# Patient Record
Sex: FEMALE | Race: WHITE | NOT HISPANIC OR LATINO | Employment: UNEMPLOYED | URBAN - METROPOLITAN AREA
[De-identification: names, ages, dates, MRNs, and addresses within clinical notes are randomized per-mention and may not be internally consistent; named-entity substitution may affect disease eponyms.]

---

## 2019-05-03 ENCOUNTER — HOSPITAL ENCOUNTER (OUTPATIENT)
Dept: OTHER | Facility: HOSPITAL | Age: 59
Discharge: HOME | End: 2019-05-03
Attending: INTERNAL MEDICINE

## 2019-05-03 LAB
ALBUMIN SERPL-MCNC: 3.9 G/DL (ref 3.5–5)
ALP SERPL-CCNC: 129 U/L (ref 53–141)
ALT SERPL-CCNC: 36 U/L (ref 10–40)
AST SERPL-CCNC: 29 U/L (ref 15–50)
BILIRUB SERPL-MCNC: 0.41 MG/DL (ref 0.2–1.3)
CHOLEST SERPL-MCNC: 125 MG/DL (ref 107–200)
CHOLEST/HDLC SERPL: 3.2 {RATIO} (ref 3–6)
CONV BILI, CONJUGATED: <0.2 MG/DL (ref 0–0.6)
CONV TOTAL PROTEIN: 7.5 G/DL (ref 6.3–8.2)
CONV UNCONJUGATED BILIRUBIN: 0.2 MG/DL (ref 0–1.1)
HDLC SERPL-MCNC: 39 MG/DL (ref 40–60)
LDLC SERPL CALC-MCNC: 64 MG/DL (ref 70–100)
TRIGL SERPL-MCNC: 109 MG/DL (ref 40–150)
VLDLC SERPL-MCNC: 22 MG/DL (ref 5–37)

## 2019-12-09 ENCOUNTER — OUTSIDE FACILITY SERVICE (OUTPATIENT)
Dept: SLEEP MEDICINE | Facility: HOSPITAL | Age: 59
End: 2019-12-09

## 2019-12-09 ENCOUNTER — HOSPITAL ENCOUNTER (OUTPATIENT)
Dept: SLEEP MEDICINE | Facility: HOSPITAL | Age: 59
Discharge: HOME | End: 2019-12-09
Attending: INTERNAL MEDICINE

## 2019-12-09 PROCEDURE — 99244 OFF/OP CNSLTJ NEW/EST MOD 40: CPT | Performed by: INTERNAL MEDICINE

## 2019-12-12 ENCOUNTER — HOSPITAL ENCOUNTER (OUTPATIENT)
Dept: SLEEP MEDICINE | Facility: HOSPITAL | Age: 59
Discharge: HOME | End: 2019-12-12
Attending: INTERNAL MEDICINE

## 2019-12-16 ENCOUNTER — OUTSIDE FACILITY SERVICE (OUTPATIENT)
Dept: SLEEP MEDICINE | Facility: HOSPITAL | Age: 59
End: 2019-12-16

## 2019-12-16 PROCEDURE — 95806 SLEEP STUDY UNATT&RESP EFFT: CPT | Performed by: INTERNAL MEDICINE

## 2019-12-18 ENCOUNTER — HOSPITAL ENCOUNTER (OUTPATIENT)
Dept: SLEEP MEDICINE | Facility: HOSPITAL | Age: 59
Discharge: HOME | End: 2019-12-18
Attending: INTERNAL MEDICINE

## 2019-12-18 ENCOUNTER — OUTSIDE FACILITY SERVICE (OUTPATIENT)
Dept: SLEEP MEDICINE | Facility: HOSPITAL | Age: 59
End: 2019-12-18

## 2019-12-18 PROCEDURE — 99213 OFFICE O/P EST LOW 20 MIN: CPT | Performed by: INTERNAL MEDICINE

## 2019-12-23 ENCOUNTER — OUTSIDE FACILITY SERVICE (OUTPATIENT)
Dept: SLEEP MEDICINE | Facility: HOSPITAL | Age: 59
End: 2019-12-23

## 2019-12-23 PROCEDURE — 95811 POLYSOM 6/>YRS CPAP 4/> PARM: CPT | Performed by: INTERNAL MEDICINE

## 2020-01-15 ENCOUNTER — HOSPITAL ENCOUNTER (OUTPATIENT)
Dept: GENERAL RADIOLOGY | Facility: HOSPITAL | Age: 60
Discharge: HOME | End: 2020-01-15
Attending: INTERNAL MEDICINE

## 2020-01-15 LAB
ALBUMIN SERPL-MCNC: 4.3 G/DL (ref 3.5–5)
ALP SERPL-CCNC: 104 U/L (ref 53–141)
ALT SERPL-CCNC: 25 U/L (ref 10–40)
AST SERPL-CCNC: 23 U/L (ref 15–50)
BILIRUB SERPL-MCNC: 0.62 MG/DL (ref 0.2–1.3)
CHOLEST SERPL-MCNC: 114 MG/DL (ref 107–200)
CHOLEST/HDLC SERPL: 3.4 {RATIO} (ref 3–6)
CONV BILI, CONJUGATED: <0.2 MG/DL (ref 0–0.6)
CONV TOTAL PROTEIN: 8 G/DL (ref 6.3–8.2)
CONV UNCONJUGATED BILIRUBIN: 0.4 MG/DL (ref 0–1.1)
HDLC SERPL-MCNC: 34 MG/DL (ref 40–60)
LDLC SERPL CALC-MCNC: 64 MG/DL (ref 70–100)
TRIGL SERPL-MCNC: 81 MG/DL (ref 40–150)
VLDLC SERPL-MCNC: 16 MG/DL (ref 5–37)

## 2020-02-24 ENCOUNTER — OUTSIDE FACILITY SERVICE (OUTPATIENT)
Dept: SLEEP MEDICINE | Facility: HOSPITAL | Age: 60
End: 2020-02-24

## 2020-02-24 ENCOUNTER — HOSPITAL ENCOUNTER (OUTPATIENT)
Dept: SLEEP MEDICINE | Facility: HOSPITAL | Age: 60
Discharge: HOME | End: 2020-02-24
Attending: INTERNAL MEDICINE

## 2020-02-24 PROCEDURE — 99214 OFFICE O/P EST MOD 30 MIN: CPT | Performed by: INTERNAL MEDICINE

## 2020-03-03 ENCOUNTER — HOSPITAL ENCOUNTER (OUTPATIENT)
Dept: GENERAL RADIOLOGY | Facility: HOSPITAL | Age: 60
Discharge: HOME | End: 2020-03-03
Attending: FAMILY MEDICINE

## 2020-07-20 ENCOUNTER — HOSPITAL ENCOUNTER (OUTPATIENT)
Dept: URGENT CARE | Facility: CLINIC | Age: 60
Discharge: HOME | End: 2020-07-20
Attending: FAMILY MEDICINE

## 2020-07-22 LAB — SARS-COV-2 RNA SPEC QL NAA+PROBE: NOT DETECTED

## 2020-12-16 ENCOUNTER — HOSPITAL ENCOUNTER (OUTPATIENT)
Dept: GENERAL RADIOLOGY | Facility: HOSPITAL | Age: 60
Discharge: HOME | End: 2020-12-16
Attending: INTERNAL MEDICINE

## 2020-12-16 LAB
ALBUMIN SERPL-MCNC: 4 G/DL (ref 3.5–5)
ALBUMIN/GLOB SERPL: 1.1 {RATIO} (ref 1.4–2.6)
ALP SERPL-CCNC: 99 U/L (ref 53–141)
ALT SERPL-CCNC: 42 U/L (ref 10–40)
ANION GAP SERPL CALC-SCNC: 20 MMOL/L (ref 8–19)
AST SERPL-CCNC: 42 U/L (ref 15–50)
BILIRUB SERPL-MCNC: 0.32 MG/DL (ref 0.2–1.3)
BUN SERPL-MCNC: 20 MG/DL (ref 5–25)
BUN/CREAT SERPL: 19 {RATIO} (ref 6–20)
CALCIUM SERPL-MCNC: 9.7 MG/DL (ref 8.7–10.4)
CHLORIDE SERPL-SCNC: 103 MMOL/L (ref 99–111)
CHOLEST SERPL-MCNC: 154 MG/DL (ref 107–200)
CHOLEST/HDLC SERPL: 3.3 {RATIO} (ref 3–6)
CONV CO2: 19 MMOL/L (ref 22–32)
CONV TOTAL PROTEIN: 7.8 G/DL (ref 6.3–8.2)
CREAT UR-MCNC: 1.07 MG/DL (ref 0.5–0.9)
GFR SERPLBLD BASED ON 1.73 SQ M-ARVRAT: 56 ML/MIN/{1.73_M2}
GLOBULIN UR ELPH-MCNC: 3.8 G/DL (ref 2–3.5)
GLUCOSE SERPL-MCNC: 111 MG/DL (ref 65–99)
HDLC SERPL-MCNC: 46 MG/DL (ref 40–60)
LDLC SERPL CALC-MCNC: 90 MG/DL (ref 70–100)
OSMOLALITY SERPL CALC.SUM OF ELEC: 287 MOSM/KG (ref 273–304)
POTASSIUM SERPL-SCNC: 4.9 MMOL/L (ref 3.5–5.3)
SODIUM SERPL-SCNC: 137 MMOL/L (ref 135–147)
TRIGL SERPL-MCNC: 90 MG/DL (ref 40–150)
VLDLC SERPL-MCNC: 18 MG/DL (ref 5–37)

## 2021-01-01 ENCOUNTER — TELEPHONE (OUTPATIENT)
Dept: NEUROSURGERY | Facility: CLINIC | Age: 61
End: 2021-01-01

## 2021-10-22 ENCOUNTER — LAB (OUTPATIENT)
Dept: LAB | Facility: HOSPITAL | Age: 61
End: 2021-10-22

## 2021-10-22 ENCOUNTER — TRANSCRIBE ORDERS (OUTPATIENT)
Dept: LAB | Facility: HOSPITAL | Age: 61
End: 2021-10-22

## 2021-10-22 DIAGNOSIS — E78.5 HYPERLIPIDEMIA, UNSPECIFIED HYPERLIPIDEMIA TYPE: ICD-10-CM

## 2021-10-22 DIAGNOSIS — E78.5 HYPERLIPIDEMIA, UNSPECIFIED HYPERLIPIDEMIA TYPE: Primary | ICD-10-CM

## 2021-10-22 LAB
ANION GAP SERPL CALCULATED.3IONS-SCNC: 7.8 MMOL/L (ref 5–15)
BUN SERPL-MCNC: 17 MG/DL (ref 8–23)
BUN/CREAT SERPL: 13.9 (ref 7–25)
CALCIUM SPEC-SCNC: 9.6 MG/DL (ref 8.6–10.5)
CHLORIDE SERPL-SCNC: 100 MMOL/L (ref 98–107)
CO2 SERPL-SCNC: 27.2 MMOL/L (ref 22–29)
CREAT SERPL-MCNC: 1.22 MG/DL (ref 0.57–1)
GFR SERPL CREATININE-BSD FRML MDRD: 45 ML/MIN/1.73
GLUCOSE SERPL-MCNC: 157 MG/DL (ref 65–99)
POTASSIUM SERPL-SCNC: 5 MMOL/L (ref 3.5–5.2)
SODIUM SERPL-SCNC: 135 MMOL/L (ref 136–145)

## 2021-10-22 PROCEDURE — 36415 COLL VENOUS BLD VENIPUNCTURE: CPT

## 2021-10-22 PROCEDURE — 80048 BASIC METABOLIC PNL TOTAL CA: CPT

## 2022-01-01 ENCOUNTER — HOSPITAL ENCOUNTER (EMERGENCY)
Facility: HOSPITAL | Age: 62
Discharge: HOME | End: 2022-09-02
Attending: STUDENT IN AN ORGANIZED HEALTH CARE EDUCATION/TRAINING PROGRAM | Admitting: STUDENT IN AN ORGANIZED HEALTH CARE EDUCATION/TRAINING PROGRAM

## 2022-01-01 ENCOUNTER — APPOINTMENT (OUTPATIENT)
Dept: CT IMAGING | Facility: HOSPITAL | Age: 62
End: 2022-01-01

## 2022-01-01 ENCOUNTER — TRANSCRIBE ORDERS (OUTPATIENT)
Dept: LAB | Facility: HOSPITAL | Age: 62
End: 2022-01-01

## 2022-01-01 ENCOUNTER — APPOINTMENT (OUTPATIENT)
Dept: GENERAL RADIOLOGY | Facility: HOSPITAL | Age: 62
End: 2022-01-01

## 2022-01-01 ENCOUNTER — READMISSION MANAGEMENT (OUTPATIENT)
Dept: CALL CENTER | Facility: HOSPITAL | Age: 62
End: 2022-01-01

## 2022-01-01 ENCOUNTER — APPOINTMENT (OUTPATIENT)
Dept: CARDIOLOGY | Facility: HOSPITAL | Age: 62
End: 2022-01-01

## 2022-01-01 ENCOUNTER — TRANSCRIBE ORDERS (OUTPATIENT)
Dept: ADMINISTRATIVE | Facility: HOSPITAL | Age: 62
End: 2022-01-01

## 2022-01-01 ENCOUNTER — HOSPITAL ENCOUNTER (EMERGENCY)
Facility: HOSPITAL | Age: 62
Discharge: HOME | End: 2022-09-05
Attending: EMERGENCY MEDICINE | Admitting: EMERGENCY MEDICINE

## 2022-01-01 ENCOUNTER — LAB (OUTPATIENT)
Dept: LAB | Facility: HOSPITAL | Age: 62
End: 2022-01-01

## 2022-01-01 ENCOUNTER — HOSPITAL ENCOUNTER (INPATIENT)
Facility: HOSPITAL | Age: 62
LOS: 2 days | Discharge: HOME HEALTH | End: 2022-10-22
Attending: EMERGENCY MEDICINE | Admitting: INTERNAL MEDICINE

## 2022-01-01 ENCOUNTER — APPOINTMENT (OUTPATIENT)
Dept: NEUROLOGY | Facility: HOSPITAL | Age: 62
End: 2022-01-01

## 2022-01-01 ENCOUNTER — HOSPITAL ENCOUNTER (INPATIENT)
Facility: HOSPITAL | Age: 62
LOS: 2 days | End: 2022-12-07
Attending: EMERGENCY MEDICINE | Admitting: STUDENT IN AN ORGANIZED HEALTH CARE EDUCATION/TRAINING PROGRAM

## 2022-01-01 ENCOUNTER — APPOINTMENT (OUTPATIENT)
Dept: MRI IMAGING | Facility: HOSPITAL | Age: 62
End: 2022-01-01

## 2022-01-01 VITALS
BODY MASS INDEX: 47.09 KG/M2 | OXYGEN SATURATION: 93 % | DIASTOLIC BLOOD PRESSURE: 64 MMHG | WEIGHT: 293 LBS | RESPIRATION RATE: 20 BRPM | TEMPERATURE: 98.2 F | HEART RATE: 65 BPM | HEIGHT: 66 IN | SYSTOLIC BLOOD PRESSURE: 153 MMHG

## 2022-01-01 VITALS
BODY MASS INDEX: 47.09 KG/M2 | HEART RATE: 53 BPM | OXYGEN SATURATION: 92 % | TEMPERATURE: 97.9 F | DIASTOLIC BLOOD PRESSURE: 47 MMHG | WEIGHT: 293 LBS | HEIGHT: 66 IN | RESPIRATION RATE: 16 BRPM | SYSTOLIC BLOOD PRESSURE: 137 MMHG

## 2022-01-01 VITALS
RESPIRATION RATE: 18 BRPM | DIASTOLIC BLOOD PRESSURE: 57 MMHG | HEIGHT: 63 IN | TEMPERATURE: 97.8 F | OXYGEN SATURATION: 94 % | HEART RATE: 56 BPM | SYSTOLIC BLOOD PRESSURE: 135 MMHG

## 2022-01-01 VITALS
HEIGHT: 66 IN | DIASTOLIC BLOOD PRESSURE: 73 MMHG | OXYGEN SATURATION: 87 % | HEART RATE: 74 BPM | SYSTOLIC BLOOD PRESSURE: 97 MMHG | WEIGHT: 293 LBS | BODY MASS INDEX: 47.09 KG/M2 | RESPIRATION RATE: 28 BRPM | TEMPERATURE: 106.9 F

## 2022-01-01 DIAGNOSIS — I46.9 CARDIAC ARREST: Primary | ICD-10-CM

## 2022-01-01 DIAGNOSIS — E78.5 HYPERLIPIDEMIA, UNSPECIFIED HYPERLIPIDEMIA TYPE: Primary | ICD-10-CM

## 2022-01-01 DIAGNOSIS — I10 ESSENTIAL HYPERTENSION, MALIGNANT: ICD-10-CM

## 2022-01-01 DIAGNOSIS — Z01.818 PREOP TESTING: ICD-10-CM

## 2022-01-01 DIAGNOSIS — R26.2 DIFFICULTY WALKING: ICD-10-CM

## 2022-01-01 DIAGNOSIS — S31.109A OPEN WOUND OF ABDOMINAL WALL, INITIAL ENCOUNTER: ICD-10-CM
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
## 2022-01-01 DIAGNOSIS — M47.12 CERVICAL SPONDYLOSIS WITH MYELOPATHY: ICD-10-CM

## 2022-01-01 DIAGNOSIS — Z78.9 DECREASED ACTIVITIES OF DAILY LIVING (ADL): ICD-10-CM

## 2022-01-01 DIAGNOSIS — E78.5 HYPERLIPIDEMIA, UNSPECIFIED HYPERLIPIDEMIA TYPE: ICD-10-CM

## 2022-01-01 DIAGNOSIS — T14.8XXA BLEEDING FROM WOUND: Primary | ICD-10-CM

## 2022-01-01 DIAGNOSIS — Z01.818 PREOP TESTING: Primary | ICD-10-CM

## 2022-01-01 DIAGNOSIS — S09.90XA INJURY OF HEAD, INITIAL ENCOUNTER: ICD-10-CM

## 2022-01-01 DIAGNOSIS — N28.9 ACUTE RENAL INSUFFICIENCY: ICD-10-CM

## 2022-01-01 DIAGNOSIS — E86.0 DEHYDRATION: Primary | ICD-10-CM

## 2022-01-01 DIAGNOSIS — R19.7 DIARRHEA, UNSPECIFIED TYPE: ICD-10-CM

## 2022-01-01 DIAGNOSIS — M47.12 CERVICAL SPONDYLOSIS WITH MYELOPATHY: Primary | ICD-10-CM

## 2022-01-01 DIAGNOSIS — I10 ESSENTIAL HYPERTENSION, MALIGNANT: Primary | ICD-10-CM

## 2022-01-01 DIAGNOSIS — N30.00 ACUTE CYSTITIS WITHOUT HEMATURIA: ICD-10-CM

## 2022-01-01 LAB
ALBUMIN SERPL-MCNC: 2.6 G/DL (ref 3.5–5.2)
ALBUMIN SERPL-MCNC: 3.4 G/DL (ref 3.5–5.2)
ALBUMIN SERPL-MCNC: 3.5 G/DL (ref 3.5–5.2)
ALBUMIN SERPL-MCNC: 3.9 G/DL (ref 3.5–5.2)
ALBUMIN SERPL-MCNC: 4.1 G/DL (ref 3.5–5.2)
ALBUMIN SERPL-MCNC: 4.1 G/DL (ref 3.5–5.2)
ALBUMIN/GLOB SERPL: 0.6 G/DL
ALBUMIN/GLOB SERPL: 0.9 G/DL
ALBUMIN/GLOB SERPL: 1 G/DL
ALBUMIN/GLOB SERPL: 1.1 G/DL
ALBUMIN/GLOB SERPL: 1.2 G/DL
ALBUMIN/GLOB SERPL: 1.3 G/DL
ALP SERPL-CCNC: 101 U/L (ref 39–117)
ALP SERPL-CCNC: 104 U/L (ref 39–117)
ALP SERPL-CCNC: 132 U/L (ref 39–117)
ALP SERPL-CCNC: 134 U/L (ref 39–117)
ALP SERPL-CCNC: 137 U/L (ref 39–117)
ALP SERPL-CCNC: 88 U/L (ref 39–117)
ALT SERPL W P-5'-P-CCNC: 22 U/L (ref 1–33)
ALT SERPL W P-5'-P-CCNC: 24 U/L (ref 1–33)
ALT SERPL W P-5'-P-CCNC: 30 U/L (ref 1–33)
ALT SERPL W P-5'-P-CCNC: 33 U/L (ref 1–33)
ALT SERPL W P-5'-P-CCNC: 34 U/L (ref 1–33)
ALT SERPL W P-5'-P-CCNC: 57 U/L (ref 1–33)
ANION GAP SERPL CALCULATED.3IONS-SCNC: 10.9 MMOL/L (ref 5–15)
ANION GAP SERPL CALCULATED.3IONS-SCNC: 11.1 MMOL/L (ref 5–15)
ANION GAP SERPL CALCULATED.3IONS-SCNC: 11.8 MMOL/L (ref 5–15)
ANION GAP SERPL CALCULATED.3IONS-SCNC: 12.1 MMOL/L (ref 5–15)
ANION GAP SERPL CALCULATED.3IONS-SCNC: 12.1 MMOL/L (ref 5–15)
ANION GAP SERPL CALCULATED.3IONS-SCNC: 16.4 MMOL/L (ref 5–15)
ANION GAP SERPL CALCULATED.3IONS-SCNC: 18.9 MMOL/L (ref 5–15)
ANION GAP SERPL CALCULATED.3IONS-SCNC: 19.8 MMOL/L (ref 5–15)
ANION GAP SERPL CALCULATED.3IONS-SCNC: 21.4 MMOL/L (ref 5–15)
ANION GAP SERPL CALCULATED.3IONS-SCNC: 22.5 MMOL/L (ref 5–15)
ANION GAP SERPL CALCULATED.3IONS-SCNC: 9.3 MMOL/L (ref 5–15)
ANION GAP SERPL CALCULATED.3IONS-SCNC: 9.4 MMOL/L (ref 5–15)
ANISOCYTOSIS BLD QL: ABNORMAL
ANISOCYTOSIS BLD QL: NORMAL
APTT PPP: 23.1 SECONDS (ref 22.2–34.2)
APTT PPP: 31 SECONDS (ref 24.2–34.2)
APTT PPP: 36.8 SECONDS (ref 24.2–34.2)
APTT PPP: 93.5 SECONDS (ref 78–95.9)
APTT PPP: >200 SECONDS (ref 78–95.9)
ARTERIAL PATENCY WRIST A: POSITIVE
AST SERPL-CCNC: 32 U/L (ref 1–32)
AST SERPL-CCNC: 38 U/L (ref 1–32)
AST SERPL-CCNC: 40 U/L (ref 1–32)
AST SERPL-CCNC: 42 U/L (ref 1–32)
AST SERPL-CCNC: 70 U/L (ref 1–32)
AST SERPL-CCNC: 89 U/L (ref 1–32)
BACTERIA SPEC AEROBE CULT: ABNORMAL
BACTERIA SPEC AEROBE CULT: NO GROWTH
BACTERIA UR QL AUTO: ABNORMAL /HPF
BACTERIA UR QL AUTO: ABNORMAL /HPF
BASE EXCESS BLDA CALC-SCNC: -20.8 MMOL/L (ref -2–2)
BASE EXCESS BLDA CALC-SCNC: -4.6 MMOL/L (ref -2–2)
BASE EXCESS BLDA CALC-SCNC: -5.9 MMOL/L (ref -2–2)
BASE EXCESS BLDA CALC-SCNC: -8.6 MMOL/L (ref -2–2)
BASOPHILS # BLD AUTO: 0.01 10*3/MM3 (ref 0–0.2)
BASOPHILS # BLD AUTO: 0.01 10*3/MM3 (ref 0–0.2)
BASOPHILS # BLD AUTO: 0.04 10*3/MM3 (ref 0–0.2)
BASOPHILS # BLD AUTO: 0.05 10*3/MM3 (ref 0–0.2)
BASOPHILS # BLD AUTO: 0.05 10*3/MM3 (ref 0–0.2)
BASOPHILS # BLD AUTO: 0.07 10*3/MM3 (ref 0–0.2)
BASOPHILS NFR BLD AUTO: 0.1 % (ref 0–1.5)
BASOPHILS NFR BLD AUTO: 0.1 % (ref 0–1.5)
BASOPHILS NFR BLD AUTO: 0.4 % (ref 0–1.5)
BASOPHILS NFR BLD AUTO: 0.7 % (ref 0–1.5)
BASOPHILS NFR BLD AUTO: 0.8 % (ref 0–1.5)
BASOPHILS NFR BLD AUTO: 0.9 % (ref 0–1.5)
BDY SITE: ABNORMAL
BH CV ECHO MEAS - AO ROOT DIAM: 3.2 CM
BH CV ECHO MEAS - EDV(MOD-SP2): 41 ML
BH CV ECHO MEAS - EDV(MOD-SP4): 29 ML
BH CV ECHO MEAS - EF(MOD-BP): 62 %
BH CV ECHO MEAS - ESV(MOD-SP2): 15 ML
BH CV ECHO MEAS - ESV(MOD-SP4): 12 ML
BH CV ECHO MEAS - IVSD: 1.3 CM
BH CV ECHO MEAS - LA DIMENSION: 3.9 CM
BH CV ECHO MEAS - LAT PEAK E' VEL: 8.4 CM/SEC
BH CV ECHO MEAS - LVIDD: 3.8 CM
BH CV ECHO MEAS - LVIDS: 2.2 CM
BH CV ECHO MEAS - LVOT DIAM: 2 CM
BH CV ECHO MEAS - LVPWD: 1.1 CM
BH CV ECHO MEAS - MED PEAK E' VEL: 10.1 CM/SEC
BH CV ECHO MEAS - MV A MAX VEL: 84 CM/SEC
BH CV ECHO MEAS - MV DEC TIME: 207 MSEC
BH CV ECHO MEAS - MV E MAX VEL: 62 CM/SEC
BH CV ECHO MEAS - MV E/A: 0.7
BH CV ECHO MEAS - RAP SYSTOLE: 3 MMHG
BH CV ECHO MEAS - RVDD: 2.9 CM
BH CV ECHO MEAS - RVSP: 47 MMHG
BH CV ECHO MEAS - TR MAX PG: 44 MMHG
BH CV ECHO MEAS - TR MAX VEL: 332 CM/SEC
BH CV ECHO MEASUREMENTS AVERAGE E/E' RATIO: 6.7
BILIRUB SERPL-MCNC: 0.4 MG/DL (ref 0–1.2)
BILIRUB SERPL-MCNC: 0.4 MG/DL (ref 0–1.2)
BILIRUB SERPL-MCNC: 0.5 MG/DL (ref 0–1.2)
BILIRUB SERPL-MCNC: 0.5 MG/DL (ref 0–1.2)
BILIRUB SERPL-MCNC: 0.6 MG/DL (ref 0–1.2)
BILIRUB SERPL-MCNC: 0.6 MG/DL (ref 0–1.2)
BILIRUB UR QL STRIP: ABNORMAL
BILIRUB UR QL STRIP: NEGATIVE
BILIRUB UR QL STRIP: NEGATIVE
BUN SERPL-MCNC: 11 MG/DL (ref 8–23)
BUN SERPL-MCNC: 16 MG/DL (ref 8–23)
BUN SERPL-MCNC: 17 MG/DL (ref 8–23)
BUN SERPL-MCNC: 18 MG/DL (ref 8–23)
BUN SERPL-MCNC: 18 MG/DL (ref 8–23)
BUN SERPL-MCNC: 26 MG/DL (ref 8–23)
BUN SERPL-MCNC: 26 MG/DL (ref 8–23)
BUN SERPL-MCNC: 51 MG/DL (ref 8–23)
BUN SERPL-MCNC: 61 MG/DL (ref 8–23)
BUN SERPL-MCNC: 69 MG/DL (ref 8–23)
BUN/CREAT SERPL: 11 (ref 7–25)
BUN/CREAT SERPL: 11.3 (ref 7–25)
BUN/CREAT SERPL: 11.8 (ref 7–25)
BUN/CREAT SERPL: 12.9 (ref 7–25)
BUN/CREAT SERPL: 13.2 (ref 7–25)
BUN/CREAT SERPL: 13.4 (ref 7–25)
BUN/CREAT SERPL: 14.3 (ref 7–25)
BUN/CREAT SERPL: 14.8 (ref 7–25)
BUN/CREAT SERPL: 18.7 (ref 7–25)
BUN/CREAT SERPL: 24.8 (ref 7–25)
BUN/CREAT SERPL: 26.6 (ref 7–25)
BUN/CREAT SERPL: 9.3 (ref 7–25)
BURR CELLS BLD QL SMEAR: ABNORMAL
CA-I BLDA-SCNC: 1.02 MMOL/L (ref 1.13–1.32)
CA-I BLDA-SCNC: 1.05 MMOL/L (ref 1.13–1.32)
CA-I BLDA-SCNC: 1.13 MMOL/L (ref 1.13–1.32)
CA-I BLDA-SCNC: 1.39 MMOL/L (ref 1.13–1.32)
CALCIUM SPEC-SCNC: 10.6 MG/DL (ref 8.6–10.5)
CALCIUM SPEC-SCNC: 10.7 MG/DL (ref 8.6–10.5)
CALCIUM SPEC-SCNC: 8.2 MG/DL (ref 8.6–10.5)
CALCIUM SPEC-SCNC: 8.8 MG/DL (ref 8.6–10.5)
CALCIUM SPEC-SCNC: 9 MG/DL (ref 8.6–10.5)
CALCIUM SPEC-SCNC: 9 MG/DL (ref 8.6–10.5)
CALCIUM SPEC-SCNC: 9.2 MG/DL (ref 8.6–10.5)
CALCIUM SPEC-SCNC: 9.2 MG/DL (ref 8.6–10.5)
CALCIUM SPEC-SCNC: 9.3 MG/DL (ref 8.6–10.5)
CALCIUM SPEC-SCNC: 9.6 MG/DL (ref 8.6–10.5)
CALCIUM SPEC-SCNC: 9.7 MG/DL (ref 8.6–10.5)
CALCIUM SPEC-SCNC: 9.9 MG/DL (ref 8.6–10.5)
CHLORIDE BLDA-SCNC: 103 MMOL/L (ref 98–106)
CHLORIDE BLDA-SCNC: 103 MMOL/L (ref 98–106)
CHLORIDE BLDA-SCNC: 104 MMOL/L (ref 98–106)
CHLORIDE BLDA-SCNC: 105 MMOL/L (ref 98–106)
CHLORIDE SERPL-SCNC: 100 MMOL/L (ref 98–107)
CHLORIDE SERPL-SCNC: 100 MMOL/L (ref 98–107)
CHLORIDE SERPL-SCNC: 101 MMOL/L (ref 98–107)
CHLORIDE SERPL-SCNC: 103 MMOL/L (ref 98–107)
CHLORIDE SERPL-SCNC: 104 MMOL/L (ref 98–107)
CHLORIDE SERPL-SCNC: 105 MMOL/L (ref 98–107)
CHLORIDE SERPL-SCNC: 98 MMOL/L (ref 98–107)
CLARITY UR: ABNORMAL
CLARITY UR: CLEAR
CLARITY UR: CLEAR
CO2 SERPL-SCNC: 16.6 MMOL/L (ref 22–29)
CO2 SERPL-SCNC: 18.9 MMOL/L (ref 22–29)
CO2 SERPL-SCNC: 19.5 MMOL/L (ref 22–29)
CO2 SERPL-SCNC: 20.1 MMOL/L (ref 22–29)
CO2 SERPL-SCNC: 20.6 MMOL/L (ref 22–29)
CO2 SERPL-SCNC: 22.1 MMOL/L (ref 22–29)
CO2 SERPL-SCNC: 23.2 MMOL/L (ref 22–29)
CO2 SERPL-SCNC: 23.9 MMOL/L (ref 22–29)
CO2 SERPL-SCNC: 25.9 MMOL/L (ref 22–29)
CO2 SERPL-SCNC: 26.2 MMOL/L (ref 22–29)
CO2 SERPL-SCNC: 27.6 MMOL/L (ref 22–29)
CO2 SERPL-SCNC: 27.7 MMOL/L (ref 22–29)
COHGB MFR BLD: 0.4 % (ref 0–1.5)
COHGB MFR BLD: 0.5 % (ref 0–1.5)
COHGB MFR BLD: 0.6 % (ref 0–1.5)
COHGB MFR BLD: 0.8 % (ref 0–1.5)
COLOR UR: ABNORMAL
COLOR UR: ABNORMAL
COLOR UR: YELLOW
CREAT SERPL-MCNC: 0.97 MG/DL (ref 0.57–1)
CREAT SERPL-MCNC: 1.05 MG/DL (ref 0.57–1)
CREAT SERPL-MCNC: 1.19 MG/DL (ref 0.57–1)
CREAT SERPL-MCNC: 1.22 MG/DL (ref 0.57–1)
CREAT SERPL-MCNC: 1.29 MG/DL (ref 0.57–1)
CREAT SERPL-MCNC: 1.32 MG/DL (ref 0.57–1)
CREAT SERPL-MCNC: 1.53 MG/DL (ref 0.57–1)
CREAT SERPL-MCNC: 1.54 MG/DL (ref 0.57–1)
CREAT SERPL-MCNC: 1.92 MG/DL (ref 0.57–1)
CREAT SERPL-MCNC: 2.79 MG/DL (ref 0.57–1)
CREAT SERPL-MCNC: 3.69 MG/DL (ref 0.57–1)
CREAT SERPL-MCNC: 4.28 MG/DL (ref 0.57–1)
D-LACTATE SERPL-SCNC: 0.9 MMOL/L (ref 0.5–2)
D-LACTATE SERPL-SCNC: 13 MMOL/L (ref 0.5–2)
D-LACTATE SERPL-SCNC: 2.5 MMOL/L (ref 0.5–2)
D-LACTATE SERPL-SCNC: 4.8 MMOL/L (ref 0.5–2)
D-LACTATE SERPL-SCNC: 6.1 MMOL/L (ref 0.5–2)
D-LACTATE SERPL-SCNC: 6.2 MMOL/L (ref 0.5–2)
D-LACTATE SERPL-SCNC: 8.2 MMOL/L (ref 0.5–2)
DEPRECATED RDW RBC AUTO: 42.9 FL (ref 37–54)
DEPRECATED RDW RBC AUTO: 45.3 FL (ref 37–54)
DEPRECATED RDW RBC AUTO: 45.4 FL (ref 37–54)
DEPRECATED RDW RBC AUTO: 46.9 FL (ref 37–54)
DEPRECATED RDW RBC AUTO: 47.1 FL (ref 37–54)
DEPRECATED RDW RBC AUTO: 47.5 FL (ref 37–54)
DEPRECATED RDW RBC AUTO: 47.7 FL (ref 37–54)
DEPRECATED RDW RBC AUTO: 58.6 FL (ref 37–54)
DEPRECATED RDW RBC AUTO: 61.2 FL (ref 37–54)
EGFRCR SERPLBLD CKD-EPI 2021: 11.2 ML/MIN/1.73
EGFRCR SERPLBLD CKD-EPI 2021: 13.3 ML/MIN/1.73
EGFRCR SERPLBLD CKD-EPI 2021: 18.6 ML/MIN/1.73
EGFRCR SERPLBLD CKD-EPI 2021: 29.2 ML/MIN/1.73
EGFRCR SERPLBLD CKD-EPI 2021: 38 ML/MIN/1.73
EGFRCR SERPLBLD CKD-EPI 2021: 38.3 ML/MIN/1.73
EGFRCR SERPLBLD CKD-EPI 2021: 45.7 ML/MIN/1.73
EGFRCR SERPLBLD CKD-EPI 2021: 47 ML/MIN/1.73
EGFRCR SERPLBLD CKD-EPI 2021: 50.3 ML/MIN/1.73
EGFRCR SERPLBLD CKD-EPI 2021: 52.1 ML/MIN/1.73
EGFRCR SERPLBLD CKD-EPI 2021: 60.2 ML/MIN/1.73
EGFRCR SERPLBLD CKD-EPI 2021: 66.2 ML/MIN/1.73
EOSINOPHIL # BLD AUTO: 0 10*3/MM3 (ref 0–0.4)
EOSINOPHIL # BLD AUTO: 0 10*3/MM3 (ref 0–0.4)
EOSINOPHIL # BLD AUTO: 0.02 10*3/MM3 (ref 0–0.4)
EOSINOPHIL # BLD AUTO: 0.31 10*3/MM3 (ref 0–0.4)
EOSINOPHIL # BLD AUTO: 0.4 10*3/MM3 (ref 0–0.4)
EOSINOPHIL # BLD AUTO: 0.42 10*3/MM3 (ref 0–0.4)
EOSINOPHIL # BLD MANUAL: 0.29 10*3/MM3 (ref 0–0.4)
EOSINOPHIL NFR BLD AUTO: 0 % (ref 0.3–6.2)
EOSINOPHIL NFR BLD AUTO: 0 % (ref 0.3–6.2)
EOSINOPHIL NFR BLD AUTO: 0.1 % (ref 0.3–6.2)
EOSINOPHIL NFR BLD AUTO: 4.7 % (ref 0.3–6.2)
EOSINOPHIL NFR BLD AUTO: 6.7 % (ref 0.3–6.2)
EOSINOPHIL NFR BLD AUTO: 7.3 % (ref 0.3–6.2)
EOSINOPHIL NFR BLD MANUAL: 3 % (ref 0.3–6.2)
ERYTHROCYTE [DISTWIDTH] IN BLOOD BY AUTOMATED COUNT: 12.2 % (ref 12.3–15.4)
ERYTHROCYTE [DISTWIDTH] IN BLOOD BY AUTOMATED COUNT: 13.2 % (ref 12.3–15.4)
ERYTHROCYTE [DISTWIDTH] IN BLOOD BY AUTOMATED COUNT: 13.3 % (ref 12.3–15.4)
ERYTHROCYTE [DISTWIDTH] IN BLOOD BY AUTOMATED COUNT: 15.9 % (ref 12.3–15.4)
ERYTHROCYTE [DISTWIDTH] IN BLOOD BY AUTOMATED COUNT: 16 % (ref 12.3–15.4)
FHHB: 11.2 % (ref 0–5)
FHHB: 11.9 % (ref 0–5)
FHHB: 4.3 % (ref 0–5)
FHHB: 4.5 % (ref 0–5)
FLUAV AG NPH QL: ABNORMAL
FLUBV AG NPH QL IA: ABNORMAL
GAS FLOW AIRWAY: 15 LPM
GAS FLOW AIRWAY: ABNORMAL L/MIN
GLOBULIN UR ELPH-MCNC: 3.2 GM/DL
GLOBULIN UR ELPH-MCNC: 3.2 GM/DL
GLOBULIN UR ELPH-MCNC: 3.3 GM/DL
GLOBULIN UR ELPH-MCNC: 3.8 GM/DL
GLOBULIN UR ELPH-MCNC: 3.8 GM/DL
GLOBULIN UR ELPH-MCNC: 4.2 GM/DL
GLUCOSE BLDA-MCNC: 208 MG/DL (ref 65–99)
GLUCOSE BLDA-MCNC: 228 MG/DL (ref 65–99)
GLUCOSE BLDA-MCNC: 257 MG/DL (ref 65–99)
GLUCOSE BLDA-MCNC: 258 MG/DL (ref 65–99)
GLUCOSE SERPL-MCNC: 111 MG/DL (ref 65–99)
GLUCOSE SERPL-MCNC: 120 MG/DL (ref 65–99)
GLUCOSE SERPL-MCNC: 131 MG/DL (ref 65–99)
GLUCOSE SERPL-MCNC: 143 MG/DL (ref 65–99)
GLUCOSE SERPL-MCNC: 156 MG/DL (ref 65–99)
GLUCOSE SERPL-MCNC: 222 MG/DL (ref 65–99)
GLUCOSE SERPL-MCNC: 231 MG/DL (ref 65–99)
GLUCOSE SERPL-MCNC: 238 MG/DL (ref 65–99)
GLUCOSE SERPL-MCNC: 80 MG/DL (ref 65–99)
GLUCOSE SERPL-MCNC: 90 MG/DL (ref 65–99)
GLUCOSE SERPL-MCNC: 91 MG/DL (ref 65–99)
GLUCOSE SERPL-MCNC: 95 MG/DL (ref 65–99)
GLUCOSE UR STRIP-MCNC: NEGATIVE MG/DL
HCO3 BLDA-SCNC: 14.6 MMOL/L (ref 22–26)
HCO3 BLDA-SCNC: 20.3 MMOL/L (ref 22–26)
HCO3 BLDA-SCNC: 24.2 MMOL/L (ref 22–26)
HCO3 BLDA-SCNC: 25.2 MMOL/L (ref 22–26)
HCT VFR BLD AUTO: 31.6 % (ref 34–46.6)
HCT VFR BLD AUTO: 34.1 % (ref 34–46.6)
HCT VFR BLD AUTO: 34.8 % (ref 34–46.6)
HCT VFR BLD AUTO: 35.4 % (ref 34–46.6)
HCT VFR BLD AUTO: 35.9 % (ref 34–46.6)
HCT VFR BLD AUTO: 36.4 % (ref 34–46.6)
HCT VFR BLD AUTO: 38.6 % (ref 34–46.6)
HCT VFR BLD AUTO: 39.1 % (ref 34–46.6)
HCT VFR BLD AUTO: 42.3 % (ref 34–46.6)
HGB BLD-MCNC: 10.4 G/DL (ref 12–15.9)
HGB BLD-MCNC: 10.9 G/DL (ref 12–15.9)
HGB BLD-MCNC: 11.1 G/DL (ref 12–15.9)
HGB BLD-MCNC: 11.4 G/DL (ref 12–15.9)
HGB BLD-MCNC: 11.9 G/DL (ref 12–15.9)
HGB BLD-MCNC: 12.2 G/DL (ref 12–15.9)
HGB BLD-MCNC: 12.7 G/DL (ref 12–15.9)
HGB BLD-MCNC: 12.8 G/DL (ref 12–15.9)
HGB BLD-MCNC: 9.6 G/DL (ref 12–15.9)
HGB BLDA-MCNC: 12.7 G/DL (ref 11.7–14.6)
HGB BLDA-MCNC: 13.7 G/DL (ref 11.7–14.6)
HGB BLDA-MCNC: 14 G/DL (ref 11.7–14.6)
HGB BLDA-MCNC: 14.3 G/DL (ref 11.7–14.6)
HGB UR QL STRIP.AUTO: ABNORMAL
HGB UR QL STRIP.AUTO: NEGATIVE
HGB UR QL STRIP.AUTO: NEGATIVE
HOLD SPECIMEN: NORMAL
HYALINE CASTS UR QL AUTO: ABNORMAL /LPF
HYALINE CASTS UR QL AUTO: ABNORMAL /LPF
HYPOCHROMIA BLD QL: NORMAL
IMM GRANULOCYTES # BLD AUTO: 0.02 10*3/MM3 (ref 0–0.05)
IMM GRANULOCYTES # BLD AUTO: 0.02 10*3/MM3 (ref 0–0.05)
IMM GRANULOCYTES # BLD AUTO: 0.03 10*3/MM3 (ref 0–0.05)
IMM GRANULOCYTES # BLD AUTO: 0.04 10*3/MM3 (ref 0–0.05)
IMM GRANULOCYTES # BLD AUTO: 0.05 10*3/MM3 (ref 0–0.05)
IMM GRANULOCYTES # BLD AUTO: 0.19 10*3/MM3 (ref 0–0.05)
IMM GRANULOCYTES NFR BLD AUTO: 0.3 % (ref 0–0.5)
IMM GRANULOCYTES NFR BLD AUTO: 0.3 % (ref 0–0.5)
IMM GRANULOCYTES NFR BLD AUTO: 0.4 % (ref 0–0.5)
IMM GRANULOCYTES NFR BLD AUTO: 0.5 % (ref 0–0.5)
IMM GRANULOCYTES NFR BLD AUTO: 0.5 % (ref 0–0.5)
IMM GRANULOCYTES NFR BLD AUTO: 1.1 % (ref 0–0.5)
INHALED O2 CONCENTRATION: 100 %
INR PPP: 0.96 (ref 2–3)
INR PPP: 1.11 (ref 0.86–1.15)
IVRT: 95 MSEC
KETONES UR QL STRIP: NEGATIVE
LACTATE BLDA-SCNC: 13.36 MMOL/L (ref 0.5–2)
LACTATE BLDA-SCNC: 4.5 MMOL/L (ref 0.5–2)
LACTATE BLDA-SCNC: 5.64 MMOL/L (ref 0.5–2)
LACTATE BLDA-SCNC: 5.76 MMOL/L (ref 0.5–2)
LEFT ATRIUM VOLUME INDEX: 24.6 ML/M2
LEUKOCYTE ESTERASE UR QL STRIP.AUTO: ABNORMAL
LEUKOCYTE ESTERASE UR QL STRIP.AUTO: ABNORMAL
LEUKOCYTE ESTERASE UR QL STRIP.AUTO: NEGATIVE
LYMPHOCYTES # BLD AUTO: 0.73 10*3/MM3 (ref 0.7–3.1)
LYMPHOCYTES # BLD AUTO: 0.92 10*3/MM3 (ref 0.7–3.1)
LYMPHOCYTES # BLD AUTO: 1.12 10*3/MM3 (ref 0.7–3.1)
LYMPHOCYTES # BLD AUTO: 1.17 10*3/MM3 (ref 0.7–3.1)
LYMPHOCYTES # BLD AUTO: 1.23 10*3/MM3 (ref 0.7–3.1)
LYMPHOCYTES # BLD AUTO: 1.24 10*3/MM3 (ref 0.7–3.1)
LYMPHOCYTES # BLD MANUAL: 3.61 10*3/MM3 (ref 0.7–3.1)
LYMPHOCYTES NFR BLD AUTO: 16.9 % (ref 19.6–45.3)
LYMPHOCYTES NFR BLD AUTO: 20.5 % (ref 19.6–45.3)
LYMPHOCYTES NFR BLD AUTO: 21.7 % (ref 19.6–45.3)
LYMPHOCYTES NFR BLD AUTO: 6.9 % (ref 19.6–45.3)
LYMPHOCYTES NFR BLD AUTO: 7 % (ref 19.6–45.3)
LYMPHOCYTES NFR BLD AUTO: 8.3 % (ref 19.6–45.3)
MACROCYTES BLD QL SMEAR: ABNORMAL
MACROCYTES BLD QL SMEAR: NORMAL
MAGNESIUM SERPL-MCNC: 1.4 MG/DL (ref 1.6–2.4)
MAGNESIUM SERPL-MCNC: 2 MG/DL (ref 1.6–2.4)
MAGNESIUM SERPL-MCNC: 2.1 MG/DL (ref 1.6–2.4)
MAGNESIUM SERPL-MCNC: 2.1 MG/DL (ref 1.6–2.4)
MAGNESIUM SERPL-MCNC: 2.5 MG/DL (ref 1.6–2.4)
MAXIMAL PREDICTED HEART RATE: 158 BPM
MCH RBC QN AUTO: 28.2 PG (ref 26.6–33)
MCH RBC QN AUTO: 28.6 PG (ref 26.6–33)
MCH RBC QN AUTO: 30.3 PG (ref 26.6–33)
MCH RBC QN AUTO: 30.5 PG (ref 26.6–33)
MCH RBC QN AUTO: 30.7 PG (ref 26.6–33)
MCH RBC QN AUTO: 31 PG (ref 26.6–33)
MCH RBC QN AUTO: 31.7 PG (ref 26.6–33)
MCH RBC QN AUTO: 31.8 PG (ref 26.6–33)
MCH RBC QN AUTO: 31.8 PG (ref 26.6–33)
MCHC RBC AUTO-ENTMCNC: 26.7 G/DL (ref 31.5–35.7)
MCHC RBC AUTO-ENTMCNC: 28.8 G/DL (ref 31.5–35.7)
MCHC RBC AUTO-ENTMCNC: 31.3 G/DL (ref 31.5–35.7)
MCHC RBC AUTO-ENTMCNC: 32.2 G/DL (ref 31.5–35.7)
MCHC RBC AUTO-ENTMCNC: 32.6 G/DL (ref 31.5–35.7)
MCHC RBC AUTO-ENTMCNC: 32.7 G/DL (ref 31.5–35.7)
MCHC RBC AUTO-ENTMCNC: 32.7 G/DL (ref 31.5–35.7)
MCHC RBC AUTO-ENTMCNC: 32.9 G/DL (ref 31.5–35.7)
MCHC RBC AUTO-ENTMCNC: 32.9 G/DL (ref 31.5–35.7)
MCV RBC AUTO: 105.3 FL (ref 79–97)
MCV RBC AUTO: 93.2 FL (ref 79–97)
MCV RBC AUTO: 94.3 FL (ref 79–97)
MCV RBC AUTO: 95.4 FL (ref 79–97)
MCV RBC AUTO: 96.3 FL (ref 79–97)
MCV RBC AUTO: 97 FL (ref 79–97)
MCV RBC AUTO: 97.3 FL (ref 79–97)
MCV RBC AUTO: 97.5 FL (ref 79–97)
MCV RBC AUTO: 99.3 FL (ref 79–97)
METAMYELOCYTES NFR BLD MANUAL: 2 % (ref 0–0)
METHGB BLD QL: 0.2 % (ref 0–1.5)
METHGB BLD QL: 0.4 % (ref 0–1.5)
MODALITY: ABNORMAL
MONOCYTES # BLD AUTO: 0.18 10*3/MM3 (ref 0.1–0.9)
MONOCYTES # BLD AUTO: 0.55 10*3/MM3 (ref 0.1–0.9)
MONOCYTES # BLD AUTO: 0.6 10*3/MM3 (ref 0.1–0.9)
MONOCYTES # BLD AUTO: 0.64 10*3/MM3 (ref 0.1–0.9)
MONOCYTES # BLD AUTO: 0.76 10*3/MM3 (ref 0.1–0.9)
MONOCYTES # BLD AUTO: 0.83 10*3/MM3 (ref 0.1–0.9)
MONOCYTES NFR BLD AUTO: 1.7 % (ref 5–12)
MONOCYTES NFR BLD AUTO: 10 % (ref 5–12)
MONOCYTES NFR BLD AUTO: 14.5 % (ref 5–12)
MONOCYTES NFR BLD AUTO: 4.5 % (ref 5–12)
MONOCYTES NFR BLD AUTO: 5 % (ref 5–12)
MONOCYTES NFR BLD AUTO: 9.7 % (ref 5–12)
MRSA SPEC QL CULT: NORMAL
NEUTROPHILS # BLD AUTO: 5.66 10*3/MM3 (ref 1.7–7)
NEUTROPHILS NFR BLD AUTO: 14.62 10*3/MM3 (ref 1.7–7)
NEUTROPHILS NFR BLD AUTO: 3.16 10*3/MM3 (ref 1.7–7)
NEUTROPHILS NFR BLD AUTO: 3.71 10*3/MM3 (ref 1.7–7)
NEUTROPHILS NFR BLD AUTO: 4.48 10*3/MM3 (ref 1.7–7)
NEUTROPHILS NFR BLD AUTO: 55.3 % (ref 42.7–76)
NEUTROPHILS NFR BLD AUTO: 61.8 % (ref 42.7–76)
NEUTROPHILS NFR BLD AUTO: 67.4 % (ref 42.7–76)
NEUTROPHILS NFR BLD AUTO: 86.2 % (ref 42.7–76)
NEUTROPHILS NFR BLD AUTO: 86.9 % (ref 42.7–76)
NEUTROPHILS NFR BLD AUTO: 9.57 10*3/MM3 (ref 1.7–7)
NEUTROPHILS NFR BLD AUTO: 9.59 10*3/MM3 (ref 1.7–7)
NEUTROPHILS NFR BLD AUTO: 90.8 % (ref 42.7–76)
NEUTROPHILS NFR BLD MANUAL: 50 % (ref 42.7–76)
NEUTS BAND NFR BLD MANUAL: 8 % (ref 0–5)
NITRITE UR QL STRIP: NEGATIVE
NOTE: ABNORMAL
NRBC BLD AUTO-RTO: 0 /100 WBC (ref 0–0.2)
NRBC BLD AUTO-RTO: 0.2 /100 WBC (ref 0–0.2)
NRBC SPEC MANUAL: 1 /100 WBC (ref 0–0.2)
NT-PROBNP SERPL-MCNC: 1913 PG/ML (ref 0–900)
NT-PROBNP SERPL-MCNC: 2085 PG/ML (ref 0–900)
OXYHGB MFR BLDV: 87.3 % (ref 94–99)
OXYHGB MFR BLDV: 87.8 % (ref 94–99)
OXYHGB MFR BLDV: 94.8 % (ref 94–99)
OXYHGB MFR BLDV: 94.9 % (ref 94–99)
PCO2 BLDA: 56.1 MM HG (ref 35–45)
PCO2 BLDA: 68.5 MM HG (ref 35–45)
PCO2 BLDA: 70.5 MM HG (ref 35–45)
PCO2 BLDA: 92.6 MM HG (ref 35–45)
PH BLDA: 6.82 PH UNITS (ref 7.35–7.45)
PH BLDA: 7.15 PH UNITS (ref 7.35–7.45)
PH BLDA: 7.18 PH UNITS (ref 7.35–7.45)
PH BLDA: 7.18 PH UNITS (ref 7.35–7.45)
PH UR STRIP.AUTO: 5.5 [PH] (ref 5–8)
PH UR STRIP.AUTO: 6.5 [PH] (ref 5–8)
PH UR STRIP.AUTO: <=5 [PH] (ref 5–8)
PHOSPHATE SERPL-MCNC: 3.7 MG/DL (ref 2.5–4.5)
PHOSPHATE SERPL-MCNC: 7.4 MG/DL (ref 2.5–4.5)
PLATELET # BLD AUTO: 185 10*3/MM3 (ref 140–450)
PLATELET # BLD AUTO: 192 10*3/MM3 (ref 140–450)
PLATELET # BLD AUTO: 199 10*3/MM3 (ref 140–450)
PLATELET # BLD AUTO: 203 10*3/MM3 (ref 140–450)
PLATELET # BLD AUTO: 204 10*3/MM3 (ref 140–450)
PLATELET # BLD AUTO: 210 10*3/MM3 (ref 140–450)
PLATELET # BLD AUTO: 223 10*3/MM3 (ref 140–450)
PLATELET # BLD AUTO: 228 10*3/MM3 (ref 140–450)
PLATELET # BLD AUTO: 241 10*3/MM3 (ref 140–450)
PMV BLD AUTO: 10.5 FL (ref 6–12)
PMV BLD AUTO: 10.8 FL (ref 6–12)
PMV BLD AUTO: 10.9 FL (ref 6–12)
PMV BLD AUTO: 11 FL (ref 6–12)
PMV BLD AUTO: 11.1 FL (ref 6–12)
PMV BLD AUTO: 11.2 FL (ref 6–12)
PMV BLD AUTO: 11.4 FL (ref 6–12)
PMV BLD AUTO: 11.7 FL (ref 6–12)
PMV BLD AUTO: 11.8 FL (ref 6–12)
PO2 BLD: 103 MM[HG] (ref 0–500)
PO2 BLD: 143 MM[HG] (ref 0–500)
PO2 BLD: 61 MM[HG] (ref 0–500)
PO2 BLD: 64 MM[HG] (ref 0–500)
PO2 BLDA: 102.8 MM HG (ref 80–100)
PO2 BLDA: 142.5 MM HG (ref 80–100)
PO2 BLDA: 60.5 MM HG (ref 80–100)
PO2 BLDA: 64.2 MM HG (ref 80–100)
POIKILOCYTOSIS BLD QL SMEAR: NORMAL
POTASSIUM BLDA-SCNC: 3.06 MMOL/L (ref 3.5–5)
POTASSIUM BLDA-SCNC: 3.59 MMOL/L (ref 3.5–5)
POTASSIUM BLDA-SCNC: 4.04 MMOL/L (ref 3.5–5)
POTASSIUM BLDA-SCNC: 4.3 MMOL/L (ref 3.5–5)
POTASSIUM SERPL-SCNC: 3.8 MMOL/L (ref 3.5–5.2)
POTASSIUM SERPL-SCNC: 3.9 MMOL/L (ref 3.5–5.2)
POTASSIUM SERPL-SCNC: 4 MMOL/L (ref 3.5–5.2)
POTASSIUM SERPL-SCNC: 4.1 MMOL/L (ref 3.5–5.2)
POTASSIUM SERPL-SCNC: 4.2 MMOL/L (ref 3.5–5.2)
POTASSIUM SERPL-SCNC: 4.3 MMOL/L (ref 3.5–5.2)
POTASSIUM SERPL-SCNC: 4.5 MMOL/L (ref 3.5–5.2)
POTASSIUM SERPL-SCNC: 4.5 MMOL/L (ref 3.5–5.2)
POTASSIUM SERPL-SCNC: 4.6 MMOL/L (ref 3.5–5.2)
POTASSIUM SERPL-SCNC: 4.7 MMOL/L (ref 3.5–5.2)
POTASSIUM SERPL-SCNC: 4.7 MMOL/L (ref 3.5–5.2)
POTASSIUM SERPL-SCNC: 4.9 MMOL/L (ref 3.5–5.2)
PROCALCITONIN SERPL-MCNC: 0.07 NG/ML (ref 0–0.25)
PROT SERPL-MCNC: 6.7 G/DL (ref 6–8.5)
PROT SERPL-MCNC: 6.8 G/DL (ref 6–8.5)
PROT SERPL-MCNC: 7.2 G/DL (ref 6–8.5)
PROT SERPL-MCNC: 7.3 G/DL (ref 6–8.5)
PROT SERPL-MCNC: 7.4 G/DL (ref 6–8.5)
PROT SERPL-MCNC: 7.7 G/DL (ref 6–8.5)
PROT UR QL STRIP: ABNORMAL
PROT UR QL STRIP: ABNORMAL
PROT UR QL STRIP: NEGATIVE
PROTHROMBIN TIME: 10.2 SECONDS (ref 9.4–12)
PROTHROMBIN TIME: 14.5 SECONDS (ref 11.8–14.9)
QT INTERVAL: 404 MS
QT INTERVAL: 476 MS
QT INTERVAL: 582 MS
RBC # BLD AUTO: 3.35 10*6/MM3 (ref 3.77–5.28)
RBC # BLD AUTO: 3.41 10*6/MM3 (ref 3.77–5.28)
RBC # BLD AUTO: 3.57 10*6/MM3 (ref 3.77–5.28)
RBC # BLD AUTO: 3.66 10*6/MM3 (ref 3.77–5.28)
RBC # BLD AUTO: 3.71 10*6/MM3 (ref 3.77–5.28)
RBC # BLD AUTO: 3.74 10*6/MM3 (ref 3.77–5.28)
RBC # BLD AUTO: 4.01 10*6/MM3 (ref 3.77–5.28)
RBC # BLD AUTO: 4.03 10*6/MM3 (ref 3.77–5.28)
RBC # BLD AUTO: 4.26 10*6/MM3 (ref 3.77–5.28)
RBC # UR STRIP: ABNORMAL /HPF
RBC # UR STRIP: ABNORMAL /HPF
REF LAB TEST METHOD: ABNORMAL
REF LAB TEST METHOD: ABNORMAL
S PYO AG THROAT QL: NEGATIVE
SAO2 % BLDCOA: 88 % (ref 95–99)
SAO2 % BLDCOA: 88.7 % (ref 95–99)
SAO2 % BLDCOA: 95.5 % (ref 95–99)
SAO2 % BLDCOA: 95.7 % (ref 95–99)
SARS-COV-2 RNA PNL SPEC NAA+PROBE: NOT DETECTED
SARS-COV-2 RNA PNL SPEC NAA+PROBE: NOT DETECTED
SMALL PLATELETS BLD QL SMEAR: ADEQUATE
SMALL PLATELETS BLD QL SMEAR: ADEQUATE
SODIUM BLDA-SCNC: 140 MMOL/L (ref 136–146)
SODIUM BLDA-SCNC: 142.5 MMOL/L (ref 136–146)
SODIUM BLDA-SCNC: 143.3 MMOL/L (ref 136–146)
SODIUM BLDA-SCNC: 144.4 MMOL/L (ref 136–146)
SODIUM SERPL-SCNC: 134 MMOL/L (ref 136–145)
SODIUM SERPL-SCNC: 136 MMOL/L (ref 136–145)
SODIUM SERPL-SCNC: 136 MMOL/L (ref 136–145)
SODIUM SERPL-SCNC: 137 MMOL/L (ref 136–145)
SODIUM SERPL-SCNC: 139 MMOL/L (ref 136–145)
SODIUM SERPL-SCNC: 139 MMOL/L (ref 136–145)
SODIUM SERPL-SCNC: 140 MMOL/L (ref 136–145)
SODIUM SERPL-SCNC: 140 MMOL/L (ref 136–145)
SODIUM SERPL-SCNC: 141 MMOL/L (ref 136–145)
SODIUM SERPL-SCNC: 142 MMOL/L (ref 136–145)
SODIUM SERPL-SCNC: 143 MMOL/L (ref 136–145)
SODIUM SERPL-SCNC: 143 MMOL/L (ref 136–145)
SP GR UR STRIP: 1.01 (ref 1–1.03)
SP GR UR STRIP: 1.02 (ref 1–1.03)
SP GR UR STRIP: 1.03 (ref 1–1.03)
SQUAMOUS #/AREA URNS HPF: ABNORMAL /HPF
SQUAMOUS #/AREA URNS HPF: ABNORMAL /HPF
STOMATOCYTES BLD QL SMEAR: NORMAL
STRESS TARGET HR: 134 BPM
TRANS CELLS #/AREA URNS HPF: ABNORMAL /HPF
TROPONIN I SERPL-MCNC: 0.02 NG/ML (ref 0–0.08)
TROPONIN T SERPL-MCNC: 0.16 NG/ML (ref 0–0.03)
TROPONIN T SERPL-MCNC: <0.01 NG/ML (ref 0–0.03)
UROBILINOGEN UR QL STRIP: ABNORMAL
UROBILINOGEN UR QL STRIP: ABNORMAL
UROBILINOGEN UR QL STRIP: NORMAL
VARIANT LYMPHS NFR BLD MANUAL: 37 % (ref 19.6–45.3)
WBC # UR STRIP: ABNORMAL /HPF
WBC # UR STRIP: ABNORMAL /HPF
WBC MORPH BLD: NORMAL
WBC MORPH BLD: NORMAL
WBC NRBC COR # BLD: 10.54 10*3/MM3 (ref 3.4–10.8)
WBC NRBC COR # BLD: 11.11 10*3/MM3 (ref 3.4–10.8)
WBC NRBC COR # BLD: 16.83 10*3/MM3 (ref 3.4–10.8)
WBC NRBC COR # BLD: 5.72 10*3/MM3 (ref 3.4–10.8)
WBC NRBC COR # BLD: 5.76 10*3/MM3 (ref 3.4–10.8)
WBC NRBC COR # BLD: 6 10*3/MM3 (ref 3.4–10.8)
WBC NRBC COR # BLD: 6.63 10*3/MM3 (ref 3.4–10.8)
WBC NRBC COR # BLD: 8.79 10*3/MM3 (ref 3.4–10.8)
WBC NRBC COR # BLD: 9.76 10*3/MM3 (ref 3.4–10.8)
WHOLE BLOOD HOLD COAG: NORMAL
WHOLE BLOOD HOLD SPECIMEN: NORMAL

## 2022-01-01 PROCEDURE — 93306 TTE W/DOPPLER COMPLETE: CPT

## 2022-01-01 PROCEDURE — 94003 VENT MGMT INPAT SUBQ DAY: CPT

## 2022-01-01 PROCEDURE — 36415 COLL VENOUS BLD VENIPUNCTURE: CPT

## 2022-01-01 PROCEDURE — 82375 ASSAY CARBOXYHB QUANT: CPT | Performed by: INTERNAL MEDICINE

## 2022-01-01 PROCEDURE — 80048 BASIC METABOLIC PNL TOTAL CA: CPT | Performed by: INTERNAL MEDICINE

## 2022-01-01 PROCEDURE — 25010000002 CEFTRIAXONE PER 250 MG: Performed by: NURSE PRACTITIONER

## 2022-01-01 PROCEDURE — 82805 BLOOD GASES W/O2 SATURATION: CPT | Performed by: PHYSICIAN ASSISTANT

## 2022-01-01 PROCEDURE — 25010000002 CEFEPIME PER 500 MG: Performed by: STUDENT IN AN ORGANIZED HEALTH CARE EDUCATION/TRAINING PROGRAM

## 2022-01-01 PROCEDURE — 93010 ELECTROCARDIOGRAM REPORT: CPT | Performed by: INTERNAL MEDICINE

## 2022-01-01 PROCEDURE — 70450 CT HEAD/BRAIN W/O DYE: CPT

## 2022-01-01 PROCEDURE — 25010000002 MAGNESIUM SULFATE IN D5W 1G/100ML (PREMIX) 1-5 GM/100ML-% SOLUTION: Performed by: INTERNAL MEDICINE

## 2022-01-01 PROCEDURE — 94799 UNLISTED PULMONARY SVC/PX: CPT

## 2022-01-01 PROCEDURE — 99223 1ST HOSP IP/OBS HIGH 75: CPT | Performed by: STUDENT IN AN ORGANIZED HEALTH CARE EDUCATION/TRAINING PROGRAM

## 2022-01-01 PROCEDURE — 85027 COMPLETE CBC AUTOMATED: CPT | Performed by: INTERNAL MEDICINE

## 2022-01-01 PROCEDURE — 83735 ASSAY OF MAGNESIUM: CPT

## 2022-01-01 PROCEDURE — U0004 COV-19 TEST NON-CDC HGH THRU: HCPCS

## 2022-01-01 PROCEDURE — 85025 COMPLETE CBC W/AUTO DIFF WBC: CPT | Performed by: EMERGENCY MEDICINE

## 2022-01-01 PROCEDURE — 84484 ASSAY OF TROPONIN QUANT: CPT | Performed by: STUDENT IN AN ORGANIZED HEALTH CARE EDUCATION/TRAINING PROGRAM

## 2022-01-01 PROCEDURE — 87070 CULTURE OTHR SPECIMN AEROBIC: CPT | Performed by: EMERGENCY MEDICINE

## 2022-01-01 PROCEDURE — 25010000002 ATROPINE SULFATE 1 MG/10ML SOLUTION PREFILLED SYRINGE: Performed by: EMERGENCY MEDICINE

## 2022-01-01 PROCEDURE — 99239 HOSP IP/OBS DSCHRG MGMT >30: CPT | Performed by: INTERNAL MEDICINE

## 2022-01-01 PROCEDURE — 25010000002 LORAZEPAM PER 2 MG: Performed by: INTERNAL MEDICINE

## 2022-01-01 PROCEDURE — 74018 RADEX ABDOMEN 1 VIEW: CPT

## 2022-01-01 PROCEDURE — 83735 ASSAY OF MAGNESIUM: CPT | Performed by: INTERNAL MEDICINE

## 2022-01-01 PROCEDURE — 25010000002 HEPARIN (PORCINE) PER 1000 UNITS: Performed by: INTERNAL MEDICINE

## 2022-01-01 PROCEDURE — 84484 ASSAY OF TROPONIN QUANT: CPT

## 2022-01-01 PROCEDURE — 97116 GAIT TRAINING THERAPY: CPT

## 2022-01-01 PROCEDURE — 83050 HGB METHEMOGLOBIN QUAN: CPT | Performed by: PHYSICIAN ASSISTANT

## 2022-01-01 PROCEDURE — 83605 ASSAY OF LACTIC ACID: CPT | Performed by: EMERGENCY MEDICINE

## 2022-01-01 PROCEDURE — 85025 COMPLETE CBC W/AUTO DIFF WBC: CPT | Performed by: FAMILY MEDICINE

## 2022-01-01 PROCEDURE — 85025 COMPLETE CBC W/AUTO DIFF WBC: CPT

## 2022-01-01 PROCEDURE — 25010000002 CEFTRIAXONE PER 250 MG: Performed by: FAMILY MEDICINE

## 2022-01-01 PROCEDURE — 87081 CULTURE SCREEN ONLY: CPT | Performed by: NURSE PRACTITIONER

## 2022-01-01 PROCEDURE — 36415 COLL VENOUS BLD VENIPUNCTURE: CPT | Performed by: EMERGENCY MEDICINE

## 2022-01-01 PROCEDURE — 80053 COMPREHEN METABOLIC PANEL: CPT | Performed by: EMERGENCY MEDICINE

## 2022-01-01 PROCEDURE — 80048 BASIC METABOLIC PNL TOTAL CA: CPT | Performed by: FAMILY MEDICINE

## 2022-01-01 PROCEDURE — 25010000002 EPINEPHRINE 1 MG/ML SOLUTION: Performed by: STUDENT IN AN ORGANIZED HEALTH CARE EDUCATION/TRAINING PROGRAM

## 2022-01-01 PROCEDURE — 93005 ELECTROCARDIOGRAM TRACING: CPT | Performed by: EMERGENCY MEDICINE

## 2022-01-01 PROCEDURE — 83735 ASSAY OF MAGNESIUM: CPT | Performed by: STUDENT IN AN ORGANIZED HEALTH CARE EDUCATION/TRAINING PROGRAM

## 2022-01-01 PROCEDURE — 36600 WITHDRAWAL OF ARTERIAL BLOOD: CPT | Performed by: EMERGENCY MEDICINE

## 2022-01-01 PROCEDURE — 83605 ASSAY OF LACTIC ACID: CPT | Performed by: NURSE PRACTITIONER

## 2022-01-01 PROCEDURE — 87086 URINE CULTURE/COLONY COUNT: CPT | Performed by: NURSE PRACTITIONER

## 2022-01-01 PROCEDURE — 5A12012 PERFORMANCE OF CARDIAC OUTPUT, SINGLE, MANUAL: ICD-10-PCS | Performed by: EMERGENCY MEDICINE

## 2022-01-01 PROCEDURE — 99291 CRITICAL CARE FIRST HOUR: CPT | Performed by: INTERNAL MEDICINE

## 2022-01-01 PROCEDURE — 85730 THROMBOPLASTIN TIME PARTIAL: CPT

## 2022-01-01 PROCEDURE — 83050 HGB METHEMOGLOBIN QUAN: CPT | Performed by: INTERNAL MEDICINE

## 2022-01-01 PROCEDURE — 85025 COMPLETE CBC W/AUTO DIFF WBC: CPT | Performed by: STUDENT IN AN ORGANIZED HEALTH CARE EDUCATION/TRAINING PROGRAM

## 2022-01-01 PROCEDURE — 97161 PT EVAL LOW COMPLEX 20 MIN: CPT

## 2022-01-01 PROCEDURE — 82805 BLOOD GASES W/O2 SATURATION: CPT | Performed by: INTERNAL MEDICINE

## 2022-01-01 PROCEDURE — 81001 URINALYSIS AUTO W/SCOPE: CPT

## 2022-01-01 PROCEDURE — 80048 BASIC METABOLIC PNL TOTAL CA: CPT

## 2022-01-01 PROCEDURE — 99292 CRITICAL CARE ADDL 30 MIN: CPT | Performed by: INTERNAL MEDICINE

## 2022-01-01 PROCEDURE — 84100 ASSAY OF PHOSPHORUS: CPT | Performed by: INTERNAL MEDICINE

## 2022-01-01 PROCEDURE — 80053 COMPREHEN METABOLIC PANEL: CPT | Performed by: INTERNAL MEDICINE

## 2022-01-01 PROCEDURE — 87040 BLOOD CULTURE FOR BACTERIA: CPT | Performed by: EMERGENCY MEDICINE

## 2022-01-01 PROCEDURE — 85007 BL SMEAR W/DIFF WBC COUNT: CPT | Performed by: STUDENT IN AN ORGANIZED HEALTH CARE EDUCATION/TRAINING PROGRAM

## 2022-01-01 PROCEDURE — 85610 PROTHROMBIN TIME: CPT | Performed by: EMERGENCY MEDICINE

## 2022-01-01 PROCEDURE — 5A1945Z RESPIRATORY VENTILATION, 24-96 CONSECUTIVE HOURS: ICD-10-PCS | Performed by: INTERNAL MEDICINE

## 2022-01-01 PROCEDURE — 85730 THROMBOPLASTIN TIME PARTIAL: CPT | Performed by: INTERNAL MEDICINE

## 2022-01-01 PROCEDURE — 25010000002 EPINEPHRINE 1 MG/ML SOLUTION 30 ML VIAL: Performed by: EMERGENCY MEDICINE

## 2022-01-01 PROCEDURE — 72125 CT NECK SPINE W/O DYE: CPT

## 2022-01-01 PROCEDURE — 94002 VENT MGMT INPAT INIT DAY: CPT

## 2022-01-01 PROCEDURE — 82375 ASSAY CARBOXYHB QUANT: CPT | Performed by: EMERGENCY MEDICINE

## 2022-01-01 PROCEDURE — 93005 ELECTROCARDIOGRAM TRACING: CPT

## 2022-01-01 PROCEDURE — 87147 CULTURE TYPE IMMUNOLOGIC: CPT | Performed by: NURSE PRACTITIONER

## 2022-01-01 PROCEDURE — 25010000002 DOBUTAMINE PER 250 MG: Performed by: INTERNAL MEDICINE

## 2022-01-01 PROCEDURE — 80053 COMPREHEN METABOLIC PANEL: CPT

## 2022-01-01 PROCEDURE — 84484 ASSAY OF TROPONIN QUANT: CPT | Performed by: EMERGENCY MEDICINE

## 2022-01-01 PROCEDURE — 99285 EMERGENCY DEPT VISIT HI MDM: CPT

## 2022-01-01 PROCEDURE — 82805 BLOOD GASES W/O2 SATURATION: CPT | Performed by: EMERGENCY MEDICINE

## 2022-01-01 PROCEDURE — 99283 EMERGENCY DEPT VISIT LOW MDM: CPT

## 2022-01-01 PROCEDURE — 83050 HGB METHEMOGLOBIN QUAN: CPT | Performed by: EMERGENCY MEDICINE

## 2022-01-01 PROCEDURE — 80053 COMPREHEN METABOLIC PANEL: CPT | Performed by: NURSE PRACTITIONER

## 2022-01-01 PROCEDURE — 74177 CT ABD & PELVIS W/CONTRAST: CPT

## 2022-01-01 PROCEDURE — 84100 ASSAY OF PHOSPHORUS: CPT | Performed by: STUDENT IN AN ORGANIZED HEALTH CARE EDUCATION/TRAINING PROGRAM

## 2022-01-01 PROCEDURE — 25010000002 AMIODARONE IN DEXTROSE 5% 360-4.14 MG/200ML-% SOLUTION: Performed by: EMERGENCY MEDICINE

## 2022-01-01 PROCEDURE — 87880 STREP A ASSAY W/OPTIC: CPT | Performed by: NURSE PRACTITIONER

## 2022-01-01 PROCEDURE — 83880 ASSAY OF NATRIURETIC PEPTIDE: CPT | Performed by: EMERGENCY MEDICINE

## 2022-01-01 PROCEDURE — 80053 COMPREHEN METABOLIC PANEL: CPT | Performed by: STUDENT IN AN ORGANIZED HEALTH CARE EDUCATION/TRAINING PROGRAM

## 2022-01-01 PROCEDURE — 71260 CT THORAX DX C+: CPT

## 2022-01-01 PROCEDURE — 87205 SMEAR GRAM STAIN: CPT | Performed by: EMERGENCY MEDICINE

## 2022-01-01 PROCEDURE — 85007 BL SMEAR W/DIFF WBC COUNT: CPT | Performed by: EMERGENCY MEDICINE

## 2022-01-01 PROCEDURE — 95822 EEG COMA OR SLEEP ONLY: CPT

## 2022-01-01 PROCEDURE — 85730 THROMBOPLASTIN TIME PARTIAL: CPT | Performed by: EMERGENCY MEDICINE

## 2022-01-01 PROCEDURE — 97165 OT EVAL LOW COMPLEX 30 MIN: CPT

## 2022-01-01 PROCEDURE — 85025 COMPLETE CBC W/AUTO DIFF WBC: CPT | Performed by: NURSE PRACTITIONER

## 2022-01-01 PROCEDURE — C9803 HOPD COVID-19 SPEC COLLECT: HCPCS

## 2022-01-01 PROCEDURE — 82375 ASSAY CARBOXYHB QUANT: CPT | Performed by: PHYSICIAN ASSISTANT

## 2022-01-01 PROCEDURE — 71045 X-RAY EXAM CHEST 1 VIEW: CPT

## 2022-01-01 PROCEDURE — 99233 SBSQ HOSP IP/OBS HIGH 50: CPT | Performed by: INTERNAL MEDICINE

## 2022-01-01 PROCEDURE — 99255 IP/OBS CONSLTJ NEW/EST HI 80: CPT | Performed by: INTERNAL MEDICINE

## 2022-01-01 PROCEDURE — 36600 WITHDRAWAL OF ARTERIAL BLOOD: CPT | Performed by: PHYSICIAN ASSISTANT

## 2022-01-01 PROCEDURE — 31500 INSERT EMERGENCY AIRWAY: CPT

## 2022-01-01 PROCEDURE — 87081 CULTURE SCREEN ONLY: CPT

## 2022-01-01 PROCEDURE — 25010000002 VANCOMYCIN 5 G RECONSTITUTED SOLUTION: Performed by: STUDENT IN AN ORGANIZED HEALTH CARE EDUCATION/TRAINING PROGRAM

## 2022-01-01 PROCEDURE — 25010000002 EPINEPHRINE 1 MG/10ML SOLUTION PREFILLED SYRINGE: Performed by: EMERGENCY MEDICINE

## 2022-01-01 PROCEDURE — 25010000002 AMIODARONE IN DEXTROSE 5% 360-4.14 MG/200ML-% SOLUTION: Performed by: STUDENT IN AN ORGANIZED HEALTH CARE EDUCATION/TRAINING PROGRAM

## 2022-01-01 PROCEDURE — 92950 HEART/LUNG RESUSCITATION CPR: CPT

## 2022-01-01 PROCEDURE — 97530 THERAPEUTIC ACTIVITIES: CPT

## 2022-01-01 PROCEDURE — 0 IOPAMIDOL PER 1 ML: Performed by: EMERGENCY MEDICINE

## 2022-01-01 PROCEDURE — 25010000002 AMIODARONE IN DEXTROSE 5% 150-4.21 MG/100ML-% SOLUTION: Performed by: EMERGENCY MEDICINE

## 2022-01-01 PROCEDURE — 85610 PROTHROMBIN TIME: CPT

## 2022-01-01 PROCEDURE — 25010000002 HEPARIN (PORCINE) 25000-0.45 UT/250ML-% SOLUTION: Performed by: INTERNAL MEDICINE

## 2022-01-01 PROCEDURE — 36415 COLL VENOUS BLD VENIPUNCTURE: CPT | Performed by: NURSE PRACTITIONER

## 2022-01-01 PROCEDURE — U0004 COV-19 TEST NON-CDC HGH THRU: HCPCS | Performed by: NURSE PRACTITIONER

## 2022-01-01 PROCEDURE — 36600 WITHDRAWAL OF ARTERIAL BLOOD: CPT | Performed by: INTERNAL MEDICINE

## 2022-01-01 PROCEDURE — 87804 INFLUENZA ASSAY W/OPTIC: CPT | Performed by: NURSE PRACTITIONER

## 2022-01-01 PROCEDURE — 25010000002 AMIODARONE PER 30 MG: Performed by: EMERGENCY MEDICINE

## 2022-01-01 PROCEDURE — 25010000002 EPINEPHRINE 1 MG/ML SOLUTION 30 ML VIAL: Performed by: STUDENT IN AN ORGANIZED HEALTH CARE EDUCATION/TRAINING PROGRAM

## 2022-01-01 PROCEDURE — 99223 1ST HOSP IP/OBS HIGH 75: CPT | Performed by: FAMILY MEDICINE

## 2022-01-01 PROCEDURE — 25010000002 MORPHINE PER 10 MG: Performed by: INTERNAL MEDICINE

## 2022-01-01 PROCEDURE — 93306 TTE W/DOPPLER COMPLETE: CPT | Performed by: INTERNAL MEDICINE

## 2022-01-01 PROCEDURE — 83880 ASSAY OF NATRIURETIC PEPTIDE: CPT | Performed by: NURSE PRACTITIONER

## 2022-01-01 PROCEDURE — 0BH17EZ INSERTION OF ENDOTRACHEAL AIRWAY INTO TRACHEA, VIA NATURAL OR ARTIFICIAL OPENING: ICD-10-PCS | Performed by: EMERGENCY MEDICINE

## 2022-01-01 PROCEDURE — 84145 PROCALCITONIN (PCT): CPT | Performed by: STUDENT IN AN ORGANIZED HEALTH CARE EDUCATION/TRAINING PROGRAM

## 2022-01-01 PROCEDURE — 81003 URINALYSIS AUTO W/O SCOPE: CPT | Performed by: INTERNAL MEDICINE

## 2022-01-01 PROCEDURE — 99284 EMERGENCY DEPT VISIT MOD MDM: CPT

## 2022-01-01 RX ORDER — MORPHINE SULFATE 2 MG/ML
2 INJECTION, SOLUTION INTRAMUSCULAR; INTRAVENOUS
Status: DISCONTINUED | OUTPATIENT
Start: 2022-01-01 | End: 2022-01-01 | Stop reason: HOSPADM

## 2022-01-01 RX ORDER — RAMIPRIL 10 MG/1
10 CAPSULE ORAL DAILY
COMMUNITY
End: 2022-01-01 | Stop reason: HOSPADM

## 2022-01-01 RX ORDER — POLYETHYLENE GLYCOL 3350 17 G/17G
17 POWDER, FOR SOLUTION ORAL DAILY PRN
Status: DISCONTINUED | OUTPATIENT
Start: 2022-01-01 | End: 2022-01-01 | Stop reason: HOSPADM

## 2022-01-01 RX ORDER — CHOLECALCIFEROL (VITAMIN D3) 125 MCG
5 CAPSULE ORAL NIGHTLY PRN
Status: DISCONTINUED | OUTPATIENT
Start: 2022-01-01 | End: 2022-01-01 | Stop reason: HOSPADM

## 2022-01-01 RX ORDER — FUROSEMIDE 40 MG/1
40 TABLET ORAL DAILY
COMMUNITY

## 2022-01-01 RX ORDER — CEFEPIME 1 G/50ML
2 INJECTION, SOLUTION INTRAVENOUS ONCE
Status: COMPLETED | OUTPATIENT
Start: 2022-01-01 | End: 2022-01-01

## 2022-01-01 RX ORDER — LORAZEPAM 2 MG/ML
1 CONCENTRATE ORAL
Status: DISCONTINUED | OUTPATIENT
Start: 2022-01-01 | End: 2022-01-01 | Stop reason: HOSPADM

## 2022-01-01 RX ORDER — SODIUM BICARBONATE IN D5W 150/1000ML
150 PLASTIC BAG, INJECTION (ML) INTRAVENOUS CONTINUOUS
Status: DISCONTINUED | OUTPATIENT
Start: 2022-01-01 | End: 2022-01-01

## 2022-01-01 RX ORDER — OSELTAMIVIR PHOSPHATE 30 MG/1
30 CAPSULE ORAL EVERY 12 HOURS SCHEDULED
Status: DISCONTINUED | OUTPATIENT
Start: 2022-01-01 | End: 2022-01-01

## 2022-01-01 RX ORDER — LIDOCAINE HYDROCHLORIDE AND EPINEPHRINE 10; 10 MG/ML; UG/ML
10 INJECTION, SOLUTION INFILTRATION; PERINEURAL ONCE
Status: COMPLETED | OUTPATIENT
Start: 2022-01-01 | End: 2022-01-01

## 2022-01-01 RX ORDER — ASPIRIN 81 MG/1
81 TABLET ORAL DAILY
COMMUNITY

## 2022-01-01 RX ORDER — POTASSIUM CHLORIDE 20 MEQ/1
20 TABLET, EXTENDED RELEASE ORAL 3 TIMES WEEKLY
COMMUNITY

## 2022-01-01 RX ORDER — SODIUM CHLORIDE 0.9 % (FLUSH) 0.9 %
10 SYRINGE (ML) INJECTION AS NEEDED
Status: DISCONTINUED | OUTPATIENT
Start: 2022-01-01 | End: 2022-01-01 | Stop reason: HOSPADM

## 2022-01-01 RX ORDER — LORAZEPAM 2 MG/ML
0.5 INJECTION INTRAMUSCULAR
Status: DISCONTINUED | OUTPATIENT
Start: 2022-01-01 | End: 2022-01-01 | Stop reason: HOSPADM

## 2022-01-01 RX ORDER — CITALOPRAM 40 MG/1
40 TABLET ORAL DAILY
COMMUNITY
Start: 2022-01-01

## 2022-01-01 RX ORDER — ATORVASTATIN CALCIUM 40 MG/1
40 TABLET, FILM COATED ORAL NIGHTLY
Status: DISCONTINUED | OUTPATIENT
Start: 2022-01-01 | End: 2022-01-01

## 2022-01-01 RX ORDER — CEFTRIAXONE SODIUM 1 G/50ML
1 INJECTION, SOLUTION INTRAVENOUS ONCE
Status: COMPLETED | OUTPATIENT
Start: 2022-01-01 | End: 2022-01-01

## 2022-01-01 RX ORDER — HYDROCODONE BITARTRATE AND ACETAMINOPHEN 7.5; 325 MG/1; MG/1
1 TABLET ORAL EVERY 8 HOURS PRN
Status: DISCONTINUED | OUTPATIENT
Start: 2022-01-01 | End: 2022-01-01 | Stop reason: HOSPADM

## 2022-01-01 RX ORDER — METOPROLOL SUCCINATE 50 MG/1
75 TABLET, EXTENDED RELEASE ORAL DAILY
COMMUNITY
Start: 2022-01-01

## 2022-01-01 RX ORDER — LORAZEPAM 0.5 MG/1
1 TABLET ORAL
Status: DISCONTINUED | OUTPATIENT
Start: 2022-01-01 | End: 2022-01-01 | Stop reason: HOSPADM

## 2022-01-01 RX ORDER — ACETAMINOPHEN 160 MG/5ML
650 SOLUTION ORAL EVERY 4 HOURS PRN
Status: DISCONTINUED | OUTPATIENT
Start: 2022-01-01 | End: 2022-01-01 | Stop reason: HOSPADM

## 2022-01-01 RX ORDER — PRASUGREL 10 MG/1
10 TABLET, FILM COATED ORAL DAILY
Status: DISCONTINUED | OUTPATIENT
Start: 2022-01-01 | End: 2022-01-01 | Stop reason: HOSPADM

## 2022-01-01 RX ORDER — DOBUTAMINE HYDROCHLORIDE 200 MG/100ML
5 INJECTION INTRAVENOUS
Status: DISCONTINUED | OUTPATIENT
Start: 2022-01-01 | End: 2022-01-01 | Stop reason: HOSPADM

## 2022-01-01 RX ORDER — NOREPINEPHRINE BIT/0.9 % NACL 8 MG/250ML
.02-3 INFUSION BOTTLE (ML) INTRAVENOUS
Status: DISCONTINUED | OUTPATIENT
Start: 2022-01-01 | End: 2022-01-01 | Stop reason: HOSPADM

## 2022-01-01 RX ORDER — LORAZEPAM 2 MG/ML
0.5 CONCENTRATE ORAL
Status: DISCONTINUED | OUTPATIENT
Start: 2022-01-01 | End: 2022-01-01 | Stop reason: HOSPADM

## 2022-01-01 RX ORDER — ACETAMINOPHEN 650 MG/1
650 SUPPOSITORY RECTAL EVERY 4 HOURS PRN
Status: DISCONTINUED | OUTPATIENT
Start: 2022-01-01 | End: 2022-01-01 | Stop reason: HOSPADM

## 2022-01-01 RX ORDER — OMEPRAZOLE 20 MG/1
20 TABLET, DELAYED RELEASE ORAL DAILY
COMMUNITY

## 2022-01-01 RX ORDER — NITROGLYCERIN 0.4 MG/1
0.4 TABLET SUBLINGUAL
Status: DISCONTINUED | OUTPATIENT
Start: 2022-01-01 | End: 2022-01-01 | Stop reason: HOSPADM

## 2022-01-01 RX ORDER — DOXYCYCLINE 100 MG/1
100 CAPSULE ORAL 2 TIMES DAILY
Qty: 20 CAPSULE | Refills: 0 | Status: ON HOLD | OUTPATIENT
Start: 2022-01-01 | End: 2022-01-01

## 2022-01-01 RX ORDER — OSELTAMIVIR PHOSPHATE 75 MG/1
75 CAPSULE ORAL EVERY 12 HOURS SCHEDULED
Status: DISCONTINUED | OUTPATIENT
Start: 2022-01-01 | End: 2022-01-01 | Stop reason: HOSPADM

## 2022-01-01 RX ORDER — HYDROCODONE BITARTRATE AND ACETAMINOPHEN 7.5; 325 MG/1; MG/1
TABLET ORAL
Status: ON HOLD | COMMUNITY
Start: 2022-01-01 | End: 2022-01-01

## 2022-01-01 RX ORDER — PRASUGREL 10 MG/1
10 TABLET, FILM COATED ORAL DAILY
COMMUNITY
Start: 2022-01-01

## 2022-01-01 RX ORDER — QUETIAPINE FUMARATE 25 MG/1
25 TABLET, FILM COATED ORAL 2 TIMES DAILY
COMMUNITY
Start: 2022-01-01

## 2022-01-01 RX ORDER — HYDROCODONE BITARTRATE AND ACETAMINOPHEN 7.5; 325 MG/1; MG/1
1 TABLET ORAL EVERY 8 HOURS PRN
Qty: 9 TABLET | Refills: 0 | Status: SHIPPED | OUTPATIENT
Start: 2022-01-01 | End: 2022-01-01

## 2022-01-01 RX ORDER — POTASSIUM CHLORIDE 1500 MG/1
20 TABLET, FILM COATED, EXTENDED RELEASE ORAL DAILY
Status: ON HOLD | COMMUNITY
Start: 2022-01-01 | End: 2022-01-01

## 2022-01-01 RX ORDER — LORAZEPAM 2 MG/1
2 TABLET ORAL
Status: DISCONTINUED | OUTPATIENT
Start: 2022-01-01 | End: 2022-01-01 | Stop reason: HOSPADM

## 2022-01-01 RX ORDER — OSELTAMIVIR PHOSPHATE 30 MG/1
30 CAPSULE ORAL
Status: DISCONTINUED | OUTPATIENT
Start: 2022-01-01 | End: 2022-01-01

## 2022-01-01 RX ORDER — SODIUM CHLORIDE, SODIUM LACTATE, POTASSIUM CHLORIDE, CALCIUM CHLORIDE 600; 310; 30; 20 MG/100ML; MG/100ML; MG/100ML; MG/100ML
100 INJECTION, SOLUTION INTRAVENOUS CONTINUOUS
Status: DISCONTINUED | OUTPATIENT
Start: 2022-01-01 | End: 2022-01-01

## 2022-01-01 RX ORDER — ACETAMINOPHEN 10 MG/ML
1000 INJECTION, SOLUTION INTRAVENOUS ONCE
Status: COMPLETED | OUTPATIENT
Start: 2022-01-01 | End: 2022-01-01

## 2022-01-01 RX ORDER — ASPIRIN 81 MG/1
81 TABLET ORAL DAILY
Status: DISCONTINUED | OUTPATIENT
Start: 2022-01-01 | End: 2022-01-01 | Stop reason: HOSPADM

## 2022-01-01 RX ORDER — LORAZEPAM 2 MG/ML
2 INJECTION INTRAMUSCULAR
Status: DISCONTINUED | OUTPATIENT
Start: 2022-01-01 | End: 2022-01-01 | Stop reason: HOSPADM

## 2022-01-01 RX ORDER — LORAZEPAM 2 MG/ML
2 CONCENTRATE ORAL
Status: DISCONTINUED | OUTPATIENT
Start: 2022-01-01 | End: 2022-01-01 | Stop reason: HOSPADM

## 2022-01-01 RX ORDER — FAMOTIDINE 10 MG/ML
20 INJECTION, SOLUTION INTRAVENOUS 2 TIMES DAILY
Status: DISCONTINUED | OUTPATIENT
Start: 2022-01-01 | End: 2022-01-01

## 2022-01-01 RX ORDER — CALCIUM CHLORIDE 100 MG/ML
INJECTION INTRAVENOUS; INTRAVENTRICULAR
Status: COMPLETED | OUTPATIENT
Start: 2022-01-01 | End: 2022-01-01

## 2022-01-01 RX ORDER — MAGNESIUM SULFATE 1 G/100ML
1 INJECTION INTRAVENOUS
Status: COMPLETED | OUTPATIENT
Start: 2022-01-01 | End: 2022-01-01

## 2022-01-01 RX ORDER — CITALOPRAM 20 MG/1
40 TABLET ORAL DAILY
Status: DISCONTINUED | OUTPATIENT
Start: 2022-01-01 | End: 2022-01-01 | Stop reason: HOSPADM

## 2022-01-01 RX ORDER — BISACODYL 10 MG
10 SUPPOSITORY, RECTAL RECTAL DAILY PRN
Status: DISCONTINUED | OUTPATIENT
Start: 2022-01-01 | End: 2022-01-01 | Stop reason: HOSPADM

## 2022-01-01 RX ORDER — ACETAMINOPHEN 325 MG/1
650 TABLET ORAL EVERY 4 HOURS PRN
Status: DISCONTINUED | OUTPATIENT
Start: 2022-01-01 | End: 2022-01-01 | Stop reason: HOSPADM

## 2022-01-01 RX ORDER — PANTOPRAZOLE SODIUM 40 MG/1
40 TABLET, DELAYED RELEASE ORAL EVERY MORNING
Status: DISCONTINUED | OUTPATIENT
Start: 2022-01-01 | End: 2022-01-01

## 2022-01-01 RX ORDER — DIPHENOXYLATE HYDROCHLORIDE AND ATROPINE SULFATE 2.5; .025 MG/1; MG/1
1 TABLET ORAL
Status: DISCONTINUED | OUTPATIENT
Start: 2022-01-01 | End: 2022-01-01 | Stop reason: HOSPADM

## 2022-01-01 RX ORDER — METOPROLOL SUCCINATE 50 MG/1
TABLET, EXTENDED RELEASE ORAL
Status: ON HOLD | COMMUNITY
Start: 2022-01-01 | End: 2022-01-01

## 2022-01-01 RX ORDER — ONDANSETRON 2 MG/ML
4 INJECTION INTRAMUSCULAR; INTRAVENOUS EVERY 6 HOURS PRN
Status: DISCONTINUED | OUTPATIENT
Start: 2022-01-01 | End: 2022-01-01 | Stop reason: HOSPADM

## 2022-01-01 RX ORDER — HEPARIN SODIUM 5000 [USP'U]/ML
5000 INJECTION, SOLUTION INTRAVENOUS; SUBCUTANEOUS EVERY 8 HOURS SCHEDULED
Status: DISCONTINUED | OUTPATIENT
Start: 2022-01-01 | End: 2022-01-01 | Stop reason: HOSPADM

## 2022-01-01 RX ORDER — SODIUM CHLORIDE 9 MG/ML
40 INJECTION, SOLUTION INTRAVENOUS AS NEEDED
Status: DISCONTINUED | OUTPATIENT
Start: 2022-01-01 | End: 2022-01-01 | Stop reason: HOSPADM

## 2022-01-01 RX ORDER — SODIUM CHLORIDE 0.9 % (FLUSH) 0.9 %
10 SYRINGE (ML) INJECTION EVERY 12 HOURS SCHEDULED
Status: DISCONTINUED | OUTPATIENT
Start: 2022-01-01 | End: 2022-01-01 | Stop reason: HOSPADM

## 2022-01-01 RX ORDER — OSELTAMIVIR PHOSPHATE 75 MG/1
75 CAPSULE ORAL EVERY 12 HOURS SCHEDULED
Qty: 5 CAPSULE | Refills: 0 | Status: SHIPPED | OUTPATIENT
Start: 2022-01-01 | End: 2022-01-01

## 2022-01-01 RX ORDER — LORAZEPAM 2 MG/ML
1 INJECTION INTRAMUSCULAR
Status: DISCONTINUED | OUTPATIENT
Start: 2022-01-01 | End: 2022-01-01 | Stop reason: HOSPADM

## 2022-01-01 RX ORDER — METOPROLOL SUCCINATE 50 MG/1
50 TABLET, EXTENDED RELEASE ORAL DAILY
Status: DISCONTINUED | OUTPATIENT
Start: 2022-01-01 | End: 2022-01-01 | Stop reason: HOSPADM

## 2022-01-01 RX ORDER — ZOLPIDEM TARTRATE 10 MG/1
10 TABLET ORAL AS NEEDED
COMMUNITY
Start: 2022-01-01

## 2022-01-01 RX ORDER — LORAZEPAM 0.5 MG/1
1 TABLET ORAL EVERY 8 HOURS PRN
Status: DISCONTINUED | OUTPATIENT
Start: 2022-01-01 | End: 2022-01-01 | Stop reason: HOSPADM

## 2022-01-01 RX ORDER — AMIODARONE HYDROCHLORIDE 50 MG/ML
INJECTION, SOLUTION INTRAVENOUS
Status: COMPLETED | OUTPATIENT
Start: 2022-01-01 | End: 2022-01-01

## 2022-01-01 RX ORDER — CHLORHEXIDINE GLUCONATE 0.12 MG/ML
15 RINSE ORAL EVERY 12 HOURS SCHEDULED
Status: DISCONTINUED | OUTPATIENT
Start: 2022-01-01 | End: 2022-01-01

## 2022-01-01 RX ORDER — ASPIRIN 81 MG/1
81 TABLET ORAL DAILY
Status: DISCONTINUED | OUTPATIENT
Start: 2022-01-01 | End: 2022-01-01

## 2022-01-01 RX ORDER — CEFDINIR 300 MG/1
300 CAPSULE ORAL 2 TIMES DAILY
Qty: 8 CAPSULE | Refills: 0 | Status: SHIPPED | OUTPATIENT
Start: 2022-01-01 | End: 2022-01-01

## 2022-01-01 RX ORDER — ASPIRIN 81 MG/1
81 TABLET, CHEWABLE ORAL DAILY
Status: DISCONTINUED | OUTPATIENT
Start: 2022-01-01 | End: 2022-01-01

## 2022-01-01 RX ORDER — CALCIUM CARBONATE 200(500)MG
2 TABLET,CHEWABLE ORAL 3 TIMES DAILY PRN
Status: DISCONTINUED | OUTPATIENT
Start: 2022-01-01 | End: 2022-01-01 | Stop reason: HOSPADM

## 2022-01-01 RX ORDER — CEFTRIAXONE SODIUM 1 G/50ML
1 INJECTION, SOLUTION INTRAVENOUS EVERY 24 HOURS
Status: DISCONTINUED | OUTPATIENT
Start: 2022-01-01 | End: 2022-01-01 | Stop reason: HOSPADM

## 2022-01-01 RX ORDER — BISACODYL 5 MG/1
5 TABLET, DELAYED RELEASE ORAL DAILY PRN
Status: DISCONTINUED | OUTPATIENT
Start: 2022-01-01 | End: 2022-01-01 | Stop reason: HOSPADM

## 2022-01-01 RX ORDER — RANOLAZINE 500 MG/1
500 TABLET, EXTENDED RELEASE ORAL 2 TIMES DAILY
COMMUNITY
Start: 2022-01-01

## 2022-01-01 RX ORDER — ACETAMINOPHEN 325 MG/1
650 TABLET ORAL EVERY 6 HOURS PRN
Status: DISCONTINUED | OUTPATIENT
Start: 2022-01-01 | End: 2022-01-01

## 2022-01-01 RX ORDER — ASPIRIN 81 MG/81MG
325 CAPSULE ORAL DAILY
Status: ON HOLD | COMMUNITY
End: 2022-01-01

## 2022-01-01 RX ORDER — HEPARIN SODIUM 10000 [USP'U]/100ML
12 INJECTION, SOLUTION INTRAVENOUS
Status: DISCONTINUED | OUTPATIENT
Start: 2022-01-01 | End: 2022-01-01 | Stop reason: HOSPADM

## 2022-01-01 RX ORDER — LISINOPRIL 20 MG/1
20 TABLET ORAL DAILY
COMMUNITY
Start: 2022-01-01

## 2022-01-01 RX ORDER — LORAZEPAM 0.5 MG/1
0.5 TABLET ORAL
Status: DISCONTINUED | OUTPATIENT
Start: 2022-01-01 | End: 2022-01-01 | Stop reason: HOSPADM

## 2022-01-01 RX ORDER — PANTOPRAZOLE SODIUM 40 MG/1
40 TABLET, DELAYED RELEASE ORAL EVERY 24 HOURS
Status: DISCONTINUED | OUTPATIENT
Start: 2022-01-01 | End: 2022-01-01 | Stop reason: HOSPADM

## 2022-01-01 RX ORDER — LORAZEPAM 1 MG/1
1 TABLET ORAL 3 TIMES DAILY
COMMUNITY
Start: 2022-01-01

## 2022-01-01 RX ORDER — AMOXICILLIN 250 MG
2 CAPSULE ORAL 2 TIMES DAILY
Status: DISCONTINUED | OUTPATIENT
Start: 2022-01-01 | End: 2022-01-01 | Stop reason: HOSPADM

## 2022-01-01 RX ORDER — GUAIFENESIN 600 MG/1
1200 TABLET, EXTENDED RELEASE ORAL 2 TIMES DAILY
COMMUNITY

## 2022-01-01 RX ORDER — ROSUVASTATIN CALCIUM 20 MG/1
20 TABLET, COATED ORAL NIGHTLY
COMMUNITY
Start: 2022-01-01

## 2022-01-01 RX ORDER — EPINEPHRINE 0.1 MG/ML
SYRINGE (ML) INJECTION
Status: COMPLETED | OUTPATIENT
Start: 2022-01-01 | End: 2022-01-01

## 2022-01-01 RX ORDER — ATROPINE SULFATE 0.1 MG/ML
INJECTION INTRAVENOUS
Status: COMPLETED | OUTPATIENT
Start: 2022-01-01 | End: 2022-01-01

## 2022-01-01 RX ADMIN — HEPARIN SODIUM 5000 UNITS: 5000 INJECTION INTRAVENOUS; SUBCUTANEOUS at 21:17

## 2022-01-01 RX ADMIN — PANTOPRAZOLE SODIUM 40 MG: 40 TABLET, DELAYED RELEASE ORAL at 06:24

## 2022-01-01 RX ADMIN — VASOPRESSIN 0.04 UNITS/MIN: 0.2 INJECTION INTRAVENOUS at 18:34

## 2022-01-01 RX ADMIN — EPINEPHRINE 1 MCG/KG/MIN: 1 INJECTION INTRAMUSCULAR; INTRAVENOUS; SUBCUTANEOUS at 23:40

## 2022-01-01 RX ADMIN — DOBUTAMINE HYDROCHLORIDE 5 MCG/KG/MIN: 200 INJECTION INTRAVENOUS at 16:27

## 2022-01-01 RX ADMIN — AMIODARONE HYDROCHLORIDE 0.5 MG/MIN: 1.8 INJECTION, SOLUTION INTRAVENOUS at 03:50

## 2022-01-01 RX ADMIN — TRANEXAMIC ACID 500 MG: 100 INJECTION, SOLUTION INTRAVENOUS at 14:52

## 2022-01-01 RX ADMIN — METOPROLOL SUCCINATE 50 MG: 50 TABLET, EXTENDED RELEASE ORAL at 09:33

## 2022-01-01 RX ADMIN — IOPAMIDOL 100 ML: 755 INJECTION, SOLUTION INTRAVENOUS at 00:47

## 2022-01-01 RX ADMIN — FAMOTIDINE 20 MG: 10 INJECTION INTRAVENOUS at 20:11

## 2022-01-01 RX ADMIN — EPINEPHRINE 0.3 MCG/KG/MIN: 1 INJECTION INTRAMUSCULAR; INTRAVENOUS; SUBCUTANEOUS at 22:06

## 2022-01-01 RX ADMIN — Medication 10 ML: at 21:19

## 2022-01-01 RX ADMIN — ASPIRIN 81 MG: 81 TABLET, COATED ORAL at 09:33

## 2022-01-01 RX ADMIN — SILVER NITRATE APPLICATORS 1 APPLICATION: 25; 75 STICK TOPICAL at 19:52

## 2022-01-01 RX ADMIN — GELATIN ABSORBABLE SPONGE 12-7 MM 1 EACH: 12-7 MISC at 14:51

## 2022-01-01 RX ADMIN — ASPIRIN 81 MG: 81 TABLET, COATED ORAL at 08:40

## 2022-01-01 RX ADMIN — OSELTAMIVIR PHOSPHATE 75 MG: 75 CAPSULE ORAL at 09:59

## 2022-01-01 RX ADMIN — ASPIRIN 81 MG CHEWABLE TABLET 81 MG: 81 TABLET CHEWABLE at 13:13

## 2022-01-01 RX ADMIN — HYDROCODONE BITARTRATE AND ACETAMINOPHEN 1 TABLET: 7.5; 325 TABLET ORAL at 17:45

## 2022-01-01 RX ADMIN — Medication 0.32 MCG/KG/MIN: at 00:31

## 2022-01-01 RX ADMIN — EPINEPHRINE 0.6 MCG/KG/MIN: 1 INJECTION INTRAMUSCULAR; INTRAVENOUS; SUBCUTANEOUS at 14:56

## 2022-01-01 RX ADMIN — LORAZEPAM 0.5 MG: 2 INJECTION INTRAMUSCULAR; INTRAVENOUS at 03:28

## 2022-01-01 RX ADMIN — Medication 10 ML: at 08:51

## 2022-01-01 RX ADMIN — Medication 10 ML: at 20:15

## 2022-01-01 RX ADMIN — OSELTAMIVIR PHOSPHATE 30 MG: 30 CAPSULE ORAL at 21:54

## 2022-01-01 RX ADMIN — CITALOPRAM HYDROBROMIDE 40 MG: 20 TABLET ORAL at 13:31

## 2022-01-01 RX ADMIN — Medication 0.02 MCG/KG/MIN: at 16:02

## 2022-01-01 RX ADMIN — HYDROCODONE BITARTRATE AND ACETAMINOPHEN 1 TABLET: 7.5; 325 TABLET ORAL at 23:00

## 2022-01-01 RX ADMIN — MAGNESIUM SULFATE HEPTAHYDRATE 1 G: 10 INJECTION, SOLUTION INTRAVENOUS at 11:52

## 2022-01-01 RX ADMIN — EPINEPHRINE 0.6 MCG/KG/MIN: 1 INJECTION INTRAMUSCULAR; INTRAVENOUS; SUBCUTANEOUS at 16:05

## 2022-01-01 RX ADMIN — LIDOCAINE HYDROCHLORIDE,EPINEPHRINE BITARTRATE 10 ML: 10; .01 INJECTION, SOLUTION INFILTRATION; PERINEURAL at 14:52

## 2022-01-01 RX ADMIN — AMIODARONE HYDROCHLORIDE 0.5 MG/MIN: 1.8 INJECTION, SOLUTION INTRAVENOUS at 13:13

## 2022-01-01 RX ADMIN — Medication 10 ML: at 09:33

## 2022-01-01 RX ADMIN — EPINEPHRINE 0.38 MCG/KG/MIN: 1 INJECTION INTRAMUSCULAR; INTRAVENOUS; SUBCUTANEOUS at 13:28

## 2022-01-01 RX ADMIN — SODIUM CHLORIDE, POTASSIUM CHLORIDE, SODIUM LACTATE AND CALCIUM CHLORIDE 100 ML/HR: 600; 310; 30; 20 INJECTION, SOLUTION INTRAVENOUS at 13:30

## 2022-01-01 RX ADMIN — PRASUGREL 10 MG: 10 TABLET, FILM COATED ORAL at 09:33

## 2022-01-01 RX ADMIN — ASPIRIN 81 MG: 81 TABLET, COATED ORAL at 13:31

## 2022-01-01 RX ADMIN — EPINEPHRINE 0.38 MCG/KG/MIN: 1 INJECTION INTRAMUSCULAR; INTRAVENOUS; SUBCUTANEOUS at 06:31

## 2022-01-01 RX ADMIN — VANCOMYCIN HYDROCHLORIDE 2750 MG: 5 INJECTION, POWDER, LYOPHILIZED, FOR SOLUTION INTRAVENOUS at 02:51

## 2022-01-01 RX ADMIN — CEFEPIME 2 G: 1 INJECTION, SOLUTION INTRAVENOUS at 02:43

## 2022-01-01 RX ADMIN — LORAZEPAM 1 MG: 0.5 TABLET ORAL at 20:56

## 2022-01-01 RX ADMIN — SODIUM BICARBONATE 50 MEQ: 84 INJECTION INTRAVENOUS at 22:15

## 2022-01-01 RX ADMIN — EPINEPHRINE 1 MG: 0.1 INJECTION INTRACARDIAC; INTRAVENOUS at 21:46

## 2022-01-01 RX ADMIN — LORAZEPAM 1 MG: 0.5 TABLET ORAL at 09:59

## 2022-01-01 RX ADMIN — PANTOPRAZOLE SODIUM 40 MG: 40 TABLET, DELAYED RELEASE ORAL at 13:31

## 2022-01-01 RX ADMIN — CITALOPRAM HYDROBROMIDE 40 MG: 20 TABLET ORAL at 08:39

## 2022-01-01 RX ADMIN — EPINEPHRINE 1 MG: 0.1 INJECTION INTRACARDIAC; INTRAVENOUS at 21:40

## 2022-01-01 RX ADMIN — MORPHINE SULFATE 2 MG: 2 INJECTION, SOLUTION INTRAMUSCULAR; INTRAVENOUS at 03:29

## 2022-01-01 RX ADMIN — HYDROCODONE BITARTRATE AND ACETAMINOPHEN 1 TABLET: 7.5; 325 TABLET ORAL at 03:29

## 2022-01-01 RX ADMIN — ACETAMINOPHEN 650 MG: 325 TABLET ORAL at 18:38

## 2022-01-01 RX ADMIN — Medication 10 ML: at 20:57

## 2022-01-01 RX ADMIN — EPINEPHRINE 0.38 MCG/KG/MIN: 1 INJECTION INTRAMUSCULAR; INTRAVENOUS; SUBCUTANEOUS at 11:35

## 2022-01-01 RX ADMIN — SENNOSIDES AND DOCUSATE SODIUM 2 TABLET: 8.6; 5 TABLET ORAL at 21:17

## 2022-01-01 RX ADMIN — EPINEPHRINE 0.5 MCG/KG/MIN: 1 INJECTION INTRAMUSCULAR; INTRAVENOUS; SUBCUTANEOUS at 03:34

## 2022-01-01 RX ADMIN — LIDOCAINE HYDROCHLORIDE,EPINEPHRINE BITARTRATE 10 ML: 10; .01 INJECTION, SOLUTION INFILTRATION; PERINEURAL at 19:52

## 2022-01-01 RX ADMIN — MAGNESIUM SULFATE HEPTAHYDRATE 1 G: 10 INJECTION, SOLUTION INTRAVENOUS at 08:51

## 2022-01-01 RX ADMIN — HYDROCODONE BITARTRATE AND ACETAMINOPHEN 1 TABLET: 7.5; 325 TABLET ORAL at 14:48

## 2022-01-01 RX ADMIN — HEPARIN SODIUM 5000 UNITS: 5000 INJECTION INTRAVENOUS; SUBCUTANEOUS at 05:53

## 2022-01-01 RX ADMIN — SODIUM CHLORIDE 1000 ML: 9 INJECTION, SOLUTION INTRAVENOUS at 00:08

## 2022-01-01 RX ADMIN — EPINEPHRINE 0.3 MCG/KG/MIN: 1 INJECTION INTRAMUSCULAR; INTRAVENOUS; SUBCUTANEOUS at 20:02

## 2022-01-01 RX ADMIN — ACETAMINOPHEN 650 MG: 325 TABLET ORAL at 10:52

## 2022-01-01 RX ADMIN — HYDROCODONE BITARTRATE AND ACETAMINOPHEN 1 TABLET: 7.5; 325 TABLET ORAL at 06:35

## 2022-01-01 RX ADMIN — ACETAMINOPHEN 1000 MG: 10 INJECTION INTRAVENOUS at 00:25

## 2022-01-01 RX ADMIN — VASOPRESSIN 0.03 UNITS/MIN: 0.2 INJECTION INTRAVENOUS at 02:39

## 2022-01-01 RX ADMIN — AMIODARONE HYDROCHLORIDE 1 MG/MIN: 1.8 INJECTION, SOLUTION INTRAVENOUS at 22:41

## 2022-01-01 RX ADMIN — CEFTRIAXONE SODIUM 1 G: 1 INJECTION, SOLUTION INTRAVENOUS at 00:08

## 2022-01-01 RX ADMIN — EPINEPHRINE 1 MCG/KG/MIN: 1 INJECTION INTRAMUSCULAR; INTRAVENOUS; SUBCUTANEOUS at 22:40

## 2022-01-01 RX ADMIN — METOPROLOL SUCCINATE 50 MG: 50 TABLET, EXTENDED RELEASE ORAL at 13:31

## 2022-01-01 RX ADMIN — MAGNESIUM SULFATE HEPTAHYDRATE 1 G: 10 INJECTION, SOLUTION INTRAVENOUS at 10:53

## 2022-01-01 RX ADMIN — ATORVASTATIN CALCIUM 40 MG: 40 TABLET, FILM COATED ORAL at 20:13

## 2022-01-01 RX ADMIN — HEPARIN SODIUM 12 UNITS/KG/HR: 10000 INJECTION, SOLUTION INTRAVENOUS at 13:05

## 2022-01-01 RX ADMIN — PHENOL 5 SPRAY: 1.5 LIQUID ORAL at 08:39

## 2022-01-01 RX ADMIN — OSELTAMIVIR PHOSPHATE 30 MG: 30 CAPSULE ORAL at 17:41

## 2022-01-01 RX ADMIN — CITALOPRAM HYDROBROMIDE 40 MG: 20 TABLET ORAL at 09:33

## 2022-01-01 RX ADMIN — PANTOPRAZOLE SODIUM 40 MG: 40 TABLET, DELAYED RELEASE ORAL at 06:33

## 2022-01-01 RX ADMIN — CEFTRIAXONE SODIUM 1 G: 1 INJECTION, SOLUTION INTRAVENOUS at 21:54

## 2022-01-01 RX ADMIN — HEPARIN SODIUM 5000 UNITS: 5000 INJECTION INTRAVENOUS; SUBCUTANEOUS at 15:20

## 2022-01-01 RX ADMIN — AMIODARONE HYDROCHLORIDE 150 MG: 50 INJECTION, SOLUTION INTRAVENOUS at 21:59

## 2022-01-01 RX ADMIN — EPINEPHRINE 1 MG: 0.1 INJECTION INTRACARDIAC; INTRAVENOUS at 22:17

## 2022-01-01 RX ADMIN — Medication 0.34 MCG/KG/MIN: at 20:03

## 2022-01-01 RX ADMIN — SODIUM CHLORIDE, POTASSIUM CHLORIDE, SODIUM LACTATE AND CALCIUM CHLORIDE 100 ML/HR: 600; 310; 30; 20 INJECTION, SOLUTION INTRAVENOUS at 03:32

## 2022-01-01 RX ADMIN — METOPROLOL SUCCINATE 50 MG: 50 TABLET, EXTENDED RELEASE ORAL at 08:39

## 2022-01-01 RX ADMIN — OSELTAMIVIR PHOSPHATE 30 MG: 30 CAPSULE ORAL at 08:40

## 2022-01-01 RX ADMIN — Medication 0.32 MCG/KG/MIN: at 22:06

## 2022-01-01 RX ADMIN — ATROPINE SULFATE 1 MG: 0.1 INJECTION INTRAVENOUS at 22:14

## 2022-01-01 RX ADMIN — SODIUM CHLORIDE 1000 ML: 9 INJECTION, SOLUTION INTRAVENOUS at 21:46

## 2022-01-01 RX ADMIN — CALCIUM CARBONATE 2 TABLET: 500 TABLET, CHEWABLE ORAL at 11:35

## 2022-01-01 RX ADMIN — CEFTRIAXONE SODIUM 1 G: 1 INJECTION, SOLUTION INTRAVENOUS at 21:19

## 2022-01-01 RX ADMIN — FAMOTIDINE 20 MG: 10 INJECTION INTRAVENOUS at 08:51

## 2022-01-01 RX ADMIN — CALCIUM CHLORIDE 1 G: 100 INJECTION INTRAVENOUS; INTRAVENTRICULAR at 21:40

## 2022-01-01 RX ADMIN — PRASUGREL 10 MG: 10 TABLET, FILM COATED ORAL at 08:39

## 2022-01-01 RX ADMIN — CHLORHEXIDINE GLUCONATE 15 ML: 1.2 RINSE ORAL at 20:18

## 2022-01-01 RX ADMIN — SODIUM BICARBONATE 50 MEQ: 84 INJECTION INTRAVENOUS at 21:41

## 2022-01-01 RX ADMIN — Medication 10 ML: at 02:51

## 2022-01-01 RX ADMIN — EPINEPHRINE 1 MCG/KG/MIN: 1 INJECTION INTRAMUSCULAR; INTRAVENOUS; SUBCUTANEOUS at 02:17

## 2022-01-01 RX ADMIN — HEPARIN SODIUM 5000 UNITS: 5000 INJECTION INTRAVENOUS; SUBCUTANEOUS at 13:53

## 2022-01-01 RX ADMIN — Medication 10 ML: at 08:40

## 2022-01-01 RX ADMIN — HEPARIN SODIUM 5000 UNITS: 5000 INJECTION INTRAVENOUS; SUBCUTANEOUS at 21:54

## 2022-01-01 RX ADMIN — Medication 10 ML: at 13:31

## 2022-01-01 RX ADMIN — CHLORHEXIDINE GLUCONATE 15 ML: 1.2 RINSE ORAL at 09:55

## 2022-01-01 RX ADMIN — EPINEPHRINE 0.5 MCG/KG/MIN: 1 INJECTION INTRAMUSCULAR; INTRAVENOUS; SUBCUTANEOUS at 04:58

## 2022-01-01 RX ADMIN — Medication 150 MEQ: at 22:21

## 2022-01-01 RX ADMIN — PRASUGREL 10 MG: 10 TABLET, FILM COATED ORAL at 13:31

## 2022-01-01 RX ADMIN — EPINEPHRINE 1 MCG/KG/MIN: 1 INJECTION INTRAMUSCULAR; INTRAVENOUS; SUBCUTANEOUS at 01:04

## 2022-01-01 RX ADMIN — MAGNESIUM SULFATE HEPTAHYDRATE 1 G: 10 INJECTION, SOLUTION INTRAVENOUS at 09:55

## 2022-01-01 RX ADMIN — HEPARIN SODIUM 5000 UNITS: 5000 INJECTION INTRAVENOUS; SUBCUTANEOUS at 06:24

## 2022-01-01 RX ADMIN — VASOPRESSIN 0.03 UNITS/MIN: 0.2 INJECTION INTRAVENOUS at 10:00

## 2022-01-01 RX ADMIN — Medication 150 MEQ: at 05:11

## 2022-09-02 NOTE — DISCHARGE INSTRUCTIONS
Please keep the area clean and dry.  The Surgicel will absorb on its own.  Please follow-up with your primary care provider in 1 week to reassess the area of bleeding.

## 2022-09-02 NOTE — ED PROVIDER NOTES
--- PROVIDER IN TRIAGE NOTE ---    Time: 4:19 PM EDT    History of Present Illness:  Patient is a 62 y.o. year old female who presents to the emergency department with an open wound to her left lower abdomen while on blood thinners.  Her dermatologist removed a mole 2 weeks ago and she has not had any complications until today it started bleeding has not stopped since 2 PM.   Chief Complaint   Patient presents with   • Wound Check        Patient presents the emergency department today complaining of bleeding from an area where she had a mole removed 2 weeks ago.  Patient states she had a mole frozen off approximately 2 weeks ago and today while getting dressed she had gauze covering the area and it pulled off.  Patient states the guaze came off she immediately started having bleeding and has not been able to control it at home.  Patient states she is on daily blood thinners.  Patient states she has not had any other complications since the procedure.  No other complaints.      History provided by:  Patient   used: No    Wound Check  Location:  Left lower quadrant/pelvic region  Severity:  Mild  Timing:  Constant  Progression:  Unchanged  Associated symptoms: no abdominal pain, no chest pain, no cough, no diarrhea, no ear pain, no fever, no headaches, no nausea, no rash, no shortness of breath and no vomiting          Patient Care Team  Primary Care Provider: Jordan Nixon MD    Past Medical History:     Allergies   Allergen Reactions   • Penicillins Swelling     Past Medical History:   Diagnosis Date   • Anxiety    • Depression    • GERD (gastroesophageal reflux disease)    • Heart attack (HCC)    • Heart disease    • Hyperlipidemia    • Hypertension    • Hypokalemia    • Injury of back    • Sleep apnea      Past Surgical History:   Procedure Laterality Date   • CERVICAL SPINE SURGERY       History reviewed. No pertinent family history.    Home Medications:  Prior to Admission medications   "  Not on File        Social History:   Social History     Tobacco Use   • Smoking status: Former Smoker   Substance Use Topics   • Alcohol use: Not Currently       Review of Systems:  Review of Systems   Constitutional: Negative for chills and fever.   HENT: Negative for ear pain.    Eyes: Negative for pain.   Respiratory: Negative for cough and shortness of breath.    Cardiovascular: Negative for chest pain.   Gastrointestinal: Negative for abdominal pain, diarrhea, nausea and vomiting.   Genitourinary: Negative for dysuria.   Musculoskeletal: Negative for arthralgias.   Skin: Positive for wound. Negative for rash.   Neurological: Negative for headaches.        Physical Exam:  /57   Pulse 56   Temp 97.8 °F (36.6 °C)   Resp 18   Ht 160 cm (63\")   SpO2 94%     Physical Exam  Vitals and nursing note reviewed.   Constitutional:       Appearance: Normal appearance.   HENT:      Head: Normocephalic and atraumatic.      Nose: Nose normal.   Eyes:      Extraocular Movements: Extraocular movements intact.      Conjunctiva/sclera: Conjunctivae normal.      Pupils: Pupils are equal, round, and reactive to light.   Cardiovascular:      Rate and Rhythm: Normal rate and regular rhythm.      Heart sounds: Normal heart sounds.   Pulmonary:      Effort: Pulmonary effort is normal.      Breath sounds: Normal breath sounds.   Abdominal:      General: Abdomen is flat.      Palpations: Abdomen is soft.   Musculoskeletal:         General: Normal range of motion.      Cervical back: Normal range of motion and neck supple.   Skin:     General: Skin is warm and dry.          Neurological:      General: No focal deficit present.      Mental Status: She is alert and oriented to person, place, and time.   Psychiatric:         Mood and Affect: Mood normal.         Behavior: Behavior normal.         Thought Content: Thought content normal.         Judgment: Judgment normal.                Medications in the Emergency " Department:  Medications   silver nitrate 75-25 % applicator 1 application (1 application Topical Given 9/2/22 1952)   lidocaine 1% - EPINEPHrine 1:685849 (XYLOCAINE W/EPI) 1 %-1:291192 injection 10 mL (10 mL Injection Given 9/2/22 1952)        Labs  Lab Results (last 24 hours)     Procedure Component Value Units Date/Time    CBC & Differential [[id number]]  (Abnormal) Collected: 09/02/22 1630    Specimen: Blood Updated: 09/02/22 1654    Narrative:      The following orders were created for panel order CBC & Differential.  Procedure                               Abnormality         Status                     ---------                               -----------         ------                     CBC Auto Differential[[id number]]        Abnormal            Final result                 Please view results for these tests on the individual orders.    Comprehensive Metabolic Panel [[id number]]  (Abnormal) Collected: 09/02/22 1630    Specimen: Blood Updated: 09/02/22 1717     Glucose 80 mg/dL      BUN 17 mg/dL      Creatinine 1.29 mg/dL      Sodium 140 mmol/L      Potassium 4.1 mmol/L      Chloride 103 mmol/L      CO2 25.9 mmol/L      Calcium 9.0 mg/dL      Total Protein 7.4 g/dL      Albumin 4.10 g/dL      ALT (SGPT) 34 U/L      AST (SGOT) 38 U/L      Alkaline Phosphatase 101 U/L      Total Bilirubin 0.6 mg/dL      Globulin 3.3 gm/dL      A/G Ratio 1.2 g/dL      BUN/Creatinine Ratio 13.2     Anion Gap 11.1 mmol/L      eGFR 47.0 mL/min/1.73      Comment: National Kidney Foundation and American Society of Nephrology (ASN) Task Force recommended calculation based on the Chronic Kidney Disease Epidemiology Collaboration (CKD-EPI) equation refit without adjustment for race.       Narrative:      GFR Normal >60  Chronic Kidney Disease <60  Kidney Failure <15      CBC Auto Differential [[id number]]  (Abnormal) Collected: 09/02/22 1630    Specimen: Blood Updated: 09/02/22 1654     WBC 5.72 10*3/mm3      RBC 4.03 10*6/mm3       Hemoglobin 12.8 g/dL      Hematocrit 39.1 %      MCV 97.0 fL      MCH 31.8 pg      MCHC 32.7 g/dL      RDW 13.2 %      RDW-SD 47.1 fl      MPV 11.1 fL      Platelets 203 10*3/mm3      Neutrophil % 55.3 %      Lymphocyte % 21.7 %      Monocyte % 14.5 %      Eosinophil % 7.3 %      Basophil % 0.9 %      Immature Grans % 0.3 %      Neutrophils, Absolute 3.16 10*3/mm3      Lymphocytes, Absolute 1.24 10*3/mm3      Monocytes, Absolute 0.83 10*3/mm3      Eosinophils, Absolute 0.42 10*3/mm3      Basophils, Absolute 0.05 10*3/mm3      Immature Grans, Absolute 0.02 10*3/mm3      nRBC 0.0 /100 WBC            Imaging:  No Radiology Exams Resulted Within Past 24 Hours    Procedures:  Wound Care    Date/Time: 9/2/2022 5:21 PM  Performed by: Vincent Daly PA-C  Authorized by: Lashonda Ash MD     Consent:     Consent obtained:  Verbal    Consent given by:  Patient    Risks, benefits, and alternatives were discussed: yes      Risks discussed:  Bleeding, infection, nerve damage and pain    Alternatives discussed:  No treatment  Universal protocol:     Patient identity confirmed:  Verbally with patient and arm band  Pre-procedure details:     Preparation: Patient was prepped and draped in usual sterile fashion    Sedation:     Sedation type:  None  Anesthesia:     Anesthesia method:  Local infiltration    Local anesthetic:  Lidocaine 1% WITH epi  Procedure details:     Indications: open wounds      Wound location:  Pelvis    Pelvis location:  Pelvis    Wound age (days):  >14    Wound surface area (sq cm):  2    Debridement performed: No    Skin layer closed with:     Wound care performed: surgicel and silver nitrate to control epistaxis.  Dressing:     Packing/drain action comment:  Surgicel    Dressing: surgicel.    Wrapped with:  Bulky dressing  Post-procedure details:     Procedure completion:  Tolerated well, no immediate complications          Medical Decision Making:  MDM  Number of Diagnoses or Management  Options  Bleeding from wound  Diagnosis management comments: I have spoken with patient. I have explained the patient´s condition, diagnoses and treatment plan based on the information available to me at this time. I have answered the patient's questions and addressed any concerns. The patient has a good  understanding of the patient´s diagnosis, condition, and treatment plan as can be expected at this point. The vital signs have been stable. The patient´s condition is stable and appropriate for discharge from the emergency department.      The patient will pursue further outpatient evaluation with the primary care physician or other designated or consulting physician as outlined in the discharge instructions. They are agreeable to this plan of care and follow-up instructions have been explained in detail. The patient has received these instructions in written format and have expressed an understanding of the discharge instructions. The patient is aware that any significant change in condition or worsening of symptoms should prompt an immediate return to this or the closest emergency department or call to 911.       Amount and/or Complexity of Data Reviewed  Clinical lab tests: reviewed and ordered    Risk of Complications, Morbidity, and/or Mortality  Presenting problems: moderate  Diagnostic procedures: low  Management options: low    Patient Progress  Patient progress: stable       Progress  ED Course as of 09/02/22 2202   Fri Sep 02, 2022   1618 --- PROVIDER IN TRIAGE NOTE ---    The patient was seen and evaluated by me Renetta Ramirez in triage. Orders were placed and the patient is currently awaiting disposition. [KS]   1906 I have attempted silver nitrate sticks to slow bleeding at this time as surgicel did not stop all the bleeding. I will inject the area with lidocaine at this time to try to slow bleeding and better see the one field that continues to bleed after initial intervention [MD]   1938 Original  surgicel was removed and replaced after silver nitrate sticks were used. No bleeding after monitoring for 10 minutes [MD]      ED Course User Index  [KS] Renetta Ramirez APRN  [MD] Vincent Daly PA-C       This patient was evaluated in triage. Orders were placed. Patient is currently awaiting final disposition.       Final diagnoses:   Bleeding from wound       ED Disposition     ED Disposition   Discharge    Condition   Stable    Comment   --               This medical record created using voice recognition software.           Vincent Daly PA-C  09/02/22 1129

## 2022-09-05 NOTE — DISCHARGE INSTRUCTIONS
Leave the pressure dressing in place.  Your dressing has Surgifoam saturated with TXA in place that has the bleeding control at time of discharge.  Take antibiotics as prescribed.  Notify your dermatologist tomorrow morning and attempt to be seen tomorrow so he is aware of the wound that has occurred from the procedure.  I placed a referral into the wound clinic.  They should call you with outpatient follow-up in the next couple of days.  Return to the ER for any concerns of active bleeding, development of severe pain, development of a fever greater than 101 or any other concerns issues that may arise.

## 2022-09-05 NOTE — ED PROVIDER NOTES
Time: 12:21 PM EDT  Arrived by: private car  Chief Complaint: wound care  History provided by: patient  History is limited by: N/A     History of Present Illness:  Patient is a 62 y.o.  female that presents to the emergency department with wound check. Pt reports having a mole that was frozen with liquid nitrogen weeks ago. On Friday a scab came off and area started to bleed. Initial lesion was frozen 2 weeks ago, she came back because gauze that was placed on Friday is now saturated with blood and blood is now dripping from gauze. Pt reports being on blood thinners and is not on antibiotics. She takes aspirin and effient.          used: No        Patient Care Team  Primary Care Provider: Jordan Nixon MD    Past Medical History:     Allergies   Allergen Reactions   • Penicillins Swelling     Past Medical History:   Diagnosis Date   • Anxiety    • Depression    • GERD (gastroesophageal reflux disease)    • Heart attack (HCC)    • Heart disease    • Hyperlipidemia    • Hypertension    • Hypokalemia    • Injury of back    • Sleep apnea      Past Surgical History:   Procedure Laterality Date   • CERVICAL SPINE SURGERY       History reviewed. No pertinent family history.    Home Medications:  Prior to Admission medications    Not on File        Social History:   Social History     Tobacco Use   • Smoking status: Former Smoker   Substance Use Topics   • Alcohol use: Not Currently       Review of Systems:  Review of Systems   Constitutional: Negative for chills and fever.   HENT: Negative for congestion, ear pain and sore throat.    Eyes: Negative for pain.   Respiratory: Negative for cough, chest tightness and shortness of breath.    Cardiovascular: Negative for chest pain.   Gastrointestinal: Negative for abdominal pain, diarrhea, nausea and vomiting.   Genitourinary: Negative for flank pain and hematuria.   Musculoskeletal: Negative for joint swelling.   Skin: Positive for wound. Negative for  "pallor.   Neurological: Negative for seizures and headaches.   All other systems reviewed and are negative.       Physical Exam:  /47   Pulse 53   Temp 97.9 °F (36.6 °C) (Oral)   Resp 16   Ht 167.6 cm (66\")   Wt (!) 137 kg (302 lb 11.1 oz)   SpO2 92%   BMI 48.86 kg/m²     Physical Exam  Vitals and nursing note reviewed.   Constitutional:       General: She is not in acute distress.     Appearance: Normal appearance. She is not toxic-appearing.   HENT:      Head: Normocephalic and atraumatic.      Mouth/Throat:      Mouth: Mucous membranes are moist.   Eyes:      General: No scleral icterus.  Cardiovascular:      Rate and Rhythm: Normal rate and regular rhythm.      Pulses: Normal pulses.      Heart sounds: Normal heart sounds.   Pulmonary:      Effort: Pulmonary effort is normal. No respiratory distress.      Breath sounds: Normal breath sounds.   Abdominal:      General: Abdomen is flat.      Palpations: Abdomen is soft.      Tenderness: There is no abdominal tenderness.   Musculoskeletal:         General: Normal range of motion.      Cervical back: Normal range of motion and neck supple.   Skin:     General: Skin is warm and dry.      Findings: Wound present.      Comments: Oval wound 4 x 2 cm with exposed subcutaneous fat that is actively loosing blood. The dressing was completely saturated with blood. Charly surrounding erythema, no mucal drainage. Wound located in left lower abdominal wall area   Neurological:      Mental Status: She is alert and oriented to person, place, and time. Mental status is at baseline.                Medications in the Emergency Department:  Medications   gelatin absorbable (GELFOAM) sponge 1 each (1 each Apply externally Given 9/5/22 1451)   Tranexamic Acid 500 mg injection (500 mg Topical Given 9/5/22 1452)   lidocaine 1% - EPINEPHrine 1:179237 (XYLOCAINE W/EPI) 1 %-1:923895 injection 10 mL (10 mL Injection Given 9/5/22 1452)        Labs  Lab Results (last 24 hours)     " Procedure Component Value Units Date/Time    Comprehensive Metabolic Panel [[id number]]  (Abnormal) Collected: 09/05/22 1145    Specimen: Blood Updated: 09/05/22 1236     Glucose 120 mg/dL      BUN 18 mg/dL      Creatinine 1.22 mg/dL      Sodium 141 mmol/L      Potassium 4.9 mmol/L      Chloride 104 mmol/L      CO2 27.7 mmol/L      Calcium 9.9 mg/dL      Total Protein 7.3 g/dL      Albumin 4.10 g/dL      ALT (SGPT) 30 U/L      AST (SGOT) 32 U/L      Alkaline Phosphatase 104 U/L      Total Bilirubin 0.6 mg/dL      Globulin 3.2 gm/dL      A/G Ratio 1.3 g/dL      BUN/Creatinine Ratio 14.8     Anion Gap 9.3 mmol/L      eGFR 50.3 mL/min/1.73      Comment: National Kidney Foundation and American Society of Nephrology (ASN) Task Force recommended calculation based on the Chronic Kidney Disease Epidemiology Collaboration (CKD-EPI) equation refit without adjustment for race.       Narrative:      GFR Normal >60  Chronic Kidney Disease <60  Kidney Failure <15      CBC & Differential [[id number]]  (Abnormal) Collected: 09/05/22 1145    Specimen: Blood Updated: 09/05/22 1210    Narrative:      The following orders were created for panel order CBC & Differential.  Procedure                               Abnormality         Status                     ---------                               -----------         ------                     CBC Auto Differential[[id number]]        Abnormal            Final result                 Please view results for these tests on the individual orders.    Protime-INR [[id number]]  (Normal) Collected: 09/05/22 1145    Specimen: Blood Updated: 09/05/22 1221     Protime 14.5 Seconds      INR 1.11    Narrative:      Suggested Therapeutic Ranges For Oral Anticoagulant Therapy:  Level of Therapy                      INR Target Range  Standard Dose                            2.0-3.0  High Dose                                2.5-3.5  Patients not receiving anticoagulant  Therapy Normal Range                      0.86-1.15    aPTT [[id number]]  (Normal) Collected: 09/05/22 1145    Specimen: Blood Updated: 09/05/22 1221     PTT 31.0 seconds     CBC Auto Differential [[id number]]  (Abnormal) Collected: 09/05/22 1145    Specimen: Blood Updated: 09/05/22 1210     WBC 6.00 10*3/mm3      RBC 3.74 10*6/mm3      Hemoglobin 11.9 g/dL      Hematocrit 36.4 %      MCV 97.3 fL      MCH 31.8 pg      MCHC 32.7 g/dL      RDW 13.2 %      RDW-SD 47.5 fl      MPV 11.2 fL      Platelets 199 10*3/mm3      Neutrophil % 61.8 %      Lymphocyte % 20.5 %      Monocyte % 10.0 %      Eosinophil % 6.7 %      Basophil % 0.7 %      Immature Grans % 0.3 %      Neutrophils, Absolute 3.71 10*3/mm3      Lymphocytes, Absolute 1.23 10*3/mm3      Monocytes, Absolute 0.60 10*3/mm3      Eosinophils, Absolute 0.40 10*3/mm3      Basophils, Absolute 0.04 10*3/mm3      Immature Grans, Absolute 0.02 10*3/mm3      nRBC 0.0 /100 WBC            Imaging:  No Radiology Exams Resulted Within Past 24 Hours     EKG:      Procedures:  Procedures    Progress                      The patient was seen and evaluated the ED by me.  The above history and physical examination was performed as document.  The patient does not have an actively bleeding wound in her left lower abdomen.  There was a arterial bleed component to this.  After cleansing the area and performing sterile technique I attempted to control the bleeding with silver nitrate cautery.  This was unsuccessful.  Then I infiltrated the area with 1% lidocaine with epinephrine and placed a 4-0 Vicryl pursestring stitch which did tamponade the active bleeding.  There is still some oozing.  At this point I applied Surgifoam and saturated with TXA.  Pressure bandage was then applied.  Patient was monitored for an hour.  There is no signs of any active bleeding at this time patient for discharge home.  I recommend patient follow-up with her dermatologist by also referred to wound care for outpatient follow-up.      Medical  Decision Making:  MDM     Final diagnoses:   Bleeding from wound   Open wound of abdominal wall, initial encounter        Disposition:  ED Disposition     ED Disposition   Discharge    Condition   Stable    Comment   --                Patrica Mora  09/05/22 1238       Albino Malave DO  09/05/22 1548

## 2022-10-20 PROBLEM — E86.0 DEHYDRATION: Status: ACTIVE | Noted: 2022-01-01

## 2022-10-23 NOTE — OUTREACH NOTE
Prep Survey    Flowsheet Row Responses   [facility] facility patient discharged from? [facility]   Is LACE score < 7 ? No   Emergency Room discharge w/ pulse ox? No   Eligibility Readm Mgmt   Discharge diagnosis Influenza type A and influenza type B   Does the patient have one of the following disease processes/diagnoses(primary or secondary)? Other   Does the patient have Home health ordered? No   Is there a DME ordered? No   Prep survey completed? Yes          KEMAL MORROW - Registered Nurse

## 2022-10-31 NOTE — OUTREACH NOTE
Medical Week 2 Survey    Flowsheet Row Responses   [facility] [place], [facility] by [facility] patient discharged from? [facility]   Does the patient have one of the following disease processes/diagnoses(primary or secondary)? Other   Week 2 attempt successful? Yes   Call start time 1452   Call end time 1455   Meds reviewed with patient/caregiver? Yes   Is the patient having any side effects they believe may be caused by any medication additions or changes? No   Does the patient have all medications ordered at discharge? Yes   Is the patient taking all medications as directed (includes completed medication regime)? Yes   Does the patient have a primary care provider?  Yes   Does the patient have an appointment with their PCP within 7 days of discharge? Yes   Has the patient kept scheduled appointments due by today? Yes   Has home health visited the patient within 72 hours of discharge? N/A   Psychosocial issues? No   Did the patient receive a copy of their discharge instructions? Yes   Nursing interventions Reviewed instructions with patient   What is the patient's perception of their health status since discharge? Improving   Is the patient/caregiver able to teach back signs and symptoms related to disease process for when to call PCP? Yes   Is the patient/caregiver able to teach back signs and symptoms related to disease process for when to call 911? Yes   Is the patient/caregiver able to teach back the hierarchy of who to call/visit for symptoms/problems? PCP, Specialist, Home health nurse, Urgent Care, ED, 911 Yes   If the patient is a current smoker, are they able to teach back resources for cessation? Not a smoker   Week 2 Call Completed? Yes   Graduated Yes   Graduated/Revoked comments Patient states is improving. States still has cough. Denies any needs/concerns.           NADINE YIN - Registered Nurse

## 2022-12-05 PROBLEM — I46.9 CARDIAC ARREST (HCC): Status: ACTIVE | Noted: 2022-01-01

## 2022-12-06 NOTE — CONSULTS
Purpose of the visit was to evaluate for: goals of care/advanced care planning. Spoke with RN and family and discussed palliative care and goals of care.      Assessment: Patient is currently in icu. Updated by primary rn regarding patients current condition. Patient currently intubated. Patients  and brother at bedside. Introduced self and explained role. Patients  reports they have 2 children, one daughter who is local and one son who is on his way up from [place]. Patients family report they are waiting for son to come visit before discussing care options moving forward. Patients  emotional during visit, emotional support provided.       Recommendations/Plan: Further decisions to be made when patients son arrives this afternoon from out of state.    Tasks Completed: Emotional Support.    Palliative care will follow up.    KIKI Granger, RN  Palliative Care

## 2022-12-06 NOTE — PROGRESS NOTES
[facility]   Hospitalist Progress Note  Date: 2022  Patient Name: Lulú Barkley  : 1960  MRN: [id number]  Date of admission: 2022      Subjective   Subjective     Chief Complaint: Out of hospital cardiac arrest    Summary: 62 y.o. female with a past medical history of coronary artery disease s/p 2 stents in LAD, 2 stents in the RCA in 2016 and 2017, scheduled for CABG in 2 weeks, hypertension, hyperlipidemia has been brought into the ED by EMS after cardiac arrest.  Patient was in the bathroom and then family members heard the thump and immediately went into the bathroom and found her to be unresponsive in the bathtub by her , he started the CPR and called the EMS around 8:48 PM.  She received 6 doses of epi and 1 bicarb in route to the hospital, 2 doses of epi 1 bicarb and 1 calcium chloride after she arrived to the hospital.  ROSC was achieved.  Had V. tach/V. fib after ROSC was achieved for which amiodarone drip was started.  Admitted to the ICU, required epinephrine, vasopressin, started on amiodarone and heparin drips.  Cardiology and pulmonology consulted.    Interval Followup: Patient remains intubated, mental status is poor.  She has no obvious cranial nerve reflexes.  Still hypotensive requiring Levophed and epinephrine.  Family is at bedside and goals of care discussions have been initiated.      Review of Systems   Unable to obtain as patient is intubated and unresponsive on ventilator    Objective   Objective     Vitals:   Temp:  [96.9 °F (36.1 °C)-99.3 °F (37.4 °C)] 99.3 °F (37.4 °C)  Heart Rate:  [] 77  Resp:  [17-24] 23  BP: ()/() 140/58  FiO2 (%):  [85 %-100 %] 100 %  Physical Exam    Constitutional: Intubated, unresponsive on ventilator, pale   Eyes: Pupils fixed and dilated, no corneal or pupillary reflexes present, ET tube in place   HENT: NCAT, mucous membranes dry   Neck: Supple, no thyromegaly, no lymphadenopathy, trachea midline   Respiratory:  Good aeration, coarse crackles noted bilaterally, synchronous with ventilator    Cardiovascular: RRR, no murmurs, rubs, or gallops   Gastrointestinal: Positive bowel sounds, soft, nontender, nondistended   Musculoskeletal: Trace lower extremity edema, extremities: Mottled   Psychiatric: Unable to assess   Neurologic: Intubated, not responsive, no withdrawal or grimace to pain, no cough or gag reflex   Skin: No rashes     Result Review    Result Review:  I have personally reviewed the results from the time of this admission to 12/6/2022 16:57 EST and agree with these findings:  [x]  Laboratory all labs reviewed  []  Microbiology  []  Radiology  [x]  EKG/Telemetry telemetry personally reviewed showing normal sinus rhythm with sinus tachycardia  []  Cardiology/Vascular   []  Pathology  []  Old records  []  Other:  CBC    CBC 10/22/22 12/5/22 12/6/22   WBC 8.79 9.76 16.83 (A)   RBC 3.66 (A) 4.26 3.41 (A)   Hemoglobin 11.1 (A) 12.2 9.6 (A)   Hematocrit 34.1 42.3 35.9   MCV 93.2 99.3 (A) 105.3 (A)   MCH 30.3 28.6 28.2   MCHC 32.6 28.8 (A) 26.7 (A)   RDW 13.2 16.0 (A) 15.9 (A)   Platelets 192 228 241   (A) Abnormal value            CMP    CMP 11/8/22 12/5/22 12/5/22 12/5/22 12/6/22 12/6/22 12/6/22 12/6/22     2152 2155 2155 0108 0759 1259 1451   Glucose 90 258 (A) 222 (A) 231 (A) 257 (A) 228 (A) 238 (A) 208 (A)   BUN 11  17 18   26 (A)    Creatinine 0.97  1.54 (A) 1.53 (A)   2.79 (A)    Sodium 139 144.4 143 142 142.5 143.3 143 140.0   Potassium 4.0  4.5 4.7   3.9    Chloride 101  101 100   100    Calcium 9.2  10.6 (A) 10.7 (A)   8.2 (A)    Albumin   3.40 (A) 3.50   2.60 (A)    Total Bilirubin   0.4 0.4   0.5    Alkaline Phosphatase   132 (A) 134 (A)   137 (A)    AST (SGOT)   40 (A) 42 (A)   89 (A)    ALT (SGPT)   22 24   33    (A) Abnormal value       Comments are available for some flowsheets but are not being displayed.             Assessment & Plan   Assessment / Plan     Assessment/Plan:  Out of hospital cardiac  arrest  V. tach/V. fib arrest  Cardiogenic shock  Severe anoxic brain injury  Severe three-vessel coronary disease, scheduled for CABG in 2 weeks  Metabolic acidosis  Lactic acidosis  Respiratory acidosis  Acute hypoxemic and hypercapnic respiratory failure requiring mechanical ventilation  Acute cardiogenic pulmonary edema  NSTEMI  Transaminitis  SIMEON  Morbid obesity with BMI 48.7 kg/M2, clinically significant    Continue to monitor in the ICU for work-up and management of the above  Pulmonology following, appreciate assistance  Consult cardiology due to cardiopulmonary arrest and V. tach/V. fib  Continue amiodarone drip for 24 hours, add heparin drip and monitor PTT levels  Transition pressors to Levophed and dobutamine  2D echo ordered and pending  Vent management per pulmonology  Plan repeat ABGs afternoon  Continue to trend lactic acid levels until cleared  Unfortunately, the patient has an extremely grim prognosis that she had a prolonged downtime and CT and clinical exam consistent with severe anoxic brain injury  Palliative care consulted.  I had a long discussion with patient's  and family at bedside.  They would like to continue to give her some time and make a decision in the next few days.  They did say that she did not want to be kept alive on machines for prolonged period  They do agree with and wish to change her CODE STATUS to DNR/DNI, will update  Trend renal function and electrolytes with a.m. BMP  Trend Hgb and WBC with a.m. CBC    Discussed plan with RN, intensivist, cardiologist    Pt is critically ill in ICU with out-of-hospital cardiac arrest, anoxic brain injury, cardiogenic shock, hypoxic and hypercapnic respiratory failure requiring mechanical ventilation, lactic acidosis. I have spent 32 minutes of critical care time reviewing previous documentation, reviewing all pertinent telemetry, labs, and imaging studies, examining the patient, modifying the care plan and discussing the  patient´s condition and care plan with the consultants, available family and during rounds. This does not include any procedures performed.    DVT prophylaxis:  Medical and mechanical DVT prophylaxis orders are present.    CODE STATUS:   Medical Intervention Limits: NO intubation (DNI); NO cardioversion  Level Of Support Discussed With: Health Care Surrogate  Code Status (Patient has no pulse and is not breathing): No CPR (Do Not Attempt to Resuscitate)  Medical Interventions (Patient has pulse or is breathing): Limited Support  Release to patient: Routine Release        Electronically signed by Brandon Borges MD, 12/06/22, 4:57 PM EST.

## 2022-12-06 NOTE — PAYOR COMM NOTE
"#Lulú Barillas (62 y.o. Female)     Date of Birth   1960    Social Security Number       Address   [address] [place], [place] [address]    Home Phone   [phone]    MRN   [id number]       Sikhism   Christianity    Marital Status                               Admission Date   22    Admission Type   Emergency    Admitting Provider   Linda Cid MD    Attending Provider   Brandon Pedro MD    Department, Room/Bed   [facility] INTENSIVE CARE UNIT, I04/       Discharge Date       Discharge Disposition       Discharge Destination                               Attending Provider: Brandon Pedro MD    Allergies: Penicillins    Isolation: None   Infection: None   Code Status: CPR    Ht: 167.6 cm (66\")   Wt: 137 kg (302 lb 0.5 oz)    Admission Cmt: None   Principal Problem: Cardiac arrest (HCC) [I46.9]                 Active Insurance as of 2022     Primary Coverage     Payor Plan Insurance Group Employer/Plan Group    HUMANA HUMANA [id number]     Payor Plan Address Payor Plan Phone Number Payor Plan Fax Number Effective Dates    [address] [address] [phone]  2022 - None Entered    [facility] [id number]       Subscriber Name Subscriber Birth Date Member ID       LULÚ BARILLAS 1960 [id number]                 Emergency Contacts      (Rel.) Home Phone Work Phone Mobile Phone    VIRGINIA BARILLAS (Spouse) [phone] -- [phone]      #[id number] PENDED CASE    CONTACT   ELIEL S UTILIZATION REVIEW    [address] [place] [address]  TAX ID [id number]  [facility]  [id number]       [phone]   [phone]         History & Physical      Linda Cid MD at [facility]           [facility]  [facility]   Patient Name: Lulú Barillas  : 1960  MRN: [id number]  Primary Care Physician:  Jordan Nixon MD  Date of admission: 2022    Subjective  "   Subjective     Chief Complaint: Cardiac arrest outside of hospital    HPI:    Lulú Barkley is a 62 y.o. female with a past medical history of coronary artery disease s/p 2 stents in LAD, 2 stents in the RCA in 2016 and 2017, scheduled for CABG in 2 weeks, hypertension, hyperlipidemia has been brought into the ED by EMS after cardiac arrest.  Patient was in the bathroom and then family members heard the thump and immediately went into the bathroom and found her to be unresponsive in the bathtub by her , he started the CPR and called the EMS around 8:48 PM.  She received 6 doses of epi and 1 bicarb in route to the hospital, 2 doses of epi 1 bicarb and 1 calcium chloride after she arrived to the hospital.  ROSC was achieved.    Patient is currently on max dose of epinephrine, vasopressin and also on amiodarone drip as she had few runs of tachycardia intermittently after the ROSC is achieved.  On initial labs, ABG 6.81/92/142, proBNP 1913, normal troponin.  Bicarb 19.5, anion gap 22.5, creatinine 1.54, ionized calcium 1.39, lactic acid 13, normal WBC.  Blood cultures were sent.  Chest x-ray showed bilateral infiltrates concerning for multifocal pneumonia versus pulmonary edema on seen.  Started on cefepime and vancomycin.  Initial EKG was concerning for ST elevations, following EKG did not show any significant ST elevations but mild ST depressions.    Patient recently had a cardiac cath done on last Thursday and is scheduled for CABG in 2 weeks.    Patient has been admitted to the critical care unit for postcardiac arrest care.      Personal History     Past Medical History:   Diagnosis Date   • Anxiety    • Depression    • GERD (gastroesophageal reflux disease)    • Heart attack (HCC)    • Heart disease    • Hyperlipidemia    • Hypertension    • Hypokalemia    • Injury of back    • Sleep apnea          Past Surgical History:   Procedure Laterality Date   • CERVICAL SPINE SURGERY           Family History    Family history unknown: Yes         Social History     Socioeconomic History   • Marital status:    Tobacco Use   • Smoking status: Former   • Smokeless tobacco: Never   Vaping Use   • Vaping Use: Never used   Substance and Sexual Activity   • Alcohol use: Not Currently   • Drug use: Never   • Sexual activity: Defer         Home Medications:  LORazepam, QUEtiapine, albuterol, aspirin, citalopram, furosemide, lisinopril, metoprolol succinate XL, omeprazole OTC, potassium chloride, prasugrel, ranolazine, rosuvastatin, and zolpidem    Allergies:  Allergies   Allergen Reactions   • Penicillins Swelling       Review of Systems   Unable to obtain review of systems due to patient's current medical condition/mentation    Objective    Objective     Vitals:   Temp:  [96.9 °F (36.1 °C)-98 °F (36.7 °C)] 96.9 °F (36.1 °C)  Heart Rate:  [] 89  Resp:  [17-24] 24  BP: ()/(26-68) 145/68  FiO2 (%):  [85 %-100 %] 85 %    Physical Exam    Constitutional: Obtunded   Eyes: Pupils equal, dilated and nonreactive   HENT: NCAT   Neck: Supple, no thyromegaly, no lymphadenopathy, trachea midline   Respiratory: Coarse breath sounds bilaterally present   Cardiovascular: RRR, no murmurs, rubs, or gallops, palpable pedal pulses bilaterally   Gastrointestinal: Positive bowel sounds, soft, nondistended   Musculoskeletal: No bilateral ankle edema, no clubbing or cyanosis to extremities   Neurologic: Obtunded, pupils dilated and nonreactive to light, not responding to noxious stimulus.   Skin: No rashes     Result Review    Result Review:  I have personally reviewed the results from the time of this admission to 12/6/2022 02:23 EST and agree with these findings:  [x]  Laboratory  [x]  Microbiology  [x]  Radiology  [x]  EKG/Telemetry   [x]  Cardiology/Vascular   []  Pathology  [x]  Old records  []  Other:    Imaging Results (Last 24 Hours)     Procedure Component Value Units Date/Time    CT Chest With Contrast Diagnostic  [[id number]] Collected: 12/06/22 0214     Updated: 12/06/22 0217    Narrative:      PROCEDURE: CT CHEST W CONTRAST DIAGNOSTIC     COMPARISON: None.     INDICATIONS: 62-year-old female w/ shortness of breath; h/o recent cardiopulmonary arrest; the pt.   is intubated & on a ventilator.     TECHNIQUE: After obtaining the patient's consent, 851 CT/CTA images were obtained with non-ionic   intravenous contrast material.       PROTOCOL:   Standard CT/CTA imaging protocol performed.      RADIATION:   Automated exposure control was utilized to minimize radiation dose.      FINDINGS: There are acute nondisplaced (or minimally displaced) extra-articular closed bilateral   anterior/anterolateral 3rd, 4th, 5th, 6th, and 7th rib fractures.  No pneumothorax is seen.  No   acute sternal fracture is suggested.  No other definite acute fractures are appreciated.  Transient   intravenous gas is suggested, such as seen in the left anterior paramidline chest wall, as on image   43 of series 501 and adjacent images.  Again, the patient is intubated.  Respiratory artifact   obscures detail.  There are small bilateral pleural effusions.  Diffuse bilateral infiltrates are   seen.  The findings may represent pulmonary edema.  Infectious multifocal pneumonia is possible.    Aspiration pneumonia cannot be excluded.  There is also atelectasis, especially in the dependent   portions of the bilateral lungs.  There is a small to moderate sized pericardial effusion.  It has   CT numbers ranging between approximately 25 and 101 Hounsfield units.  Mild cardiomegaly is   suspected.  There are extensive coronary artery calcifications.  The study was not tailored in   order to evaluate the coronary arteries.  There are degenerative changes throughout the imaged   spine.  There are small possibly reactive mediastinal lymph nodes.  No definite pulmonary embolism   is seen.  Grossly, no thoracic aortic aneurysm or dissection is suggested.  Grossly, no  definite   acute abnormality of the great arteries.  The patient's upper extremities in the scan field of view   obscure detail.  External artifacts also obscure detail.  Again, there is motion artifact on the   study.  The study is also limited due to large body habitus.  The best possible images were   obtained, considering the patient's condition and body habitus.  Please see the separately dictated   abdominal/pelvic CT exam report for further detail regarding findings in the abdomen and pelvis.         Impression:            1. The study is limited, as discussed.       2. No definite pulmonary embolism is seen.       3. No definite thoracic aortic aneurysm or dissection.       4. Diffuse bilateral infiltrates are seen, which may represent pulmonary edema.  Infectious   multifocal pneumonia is possible.       5. There are small bilateral pleural effusions.       6. There is a small-to-moderate-sized pericardial effusion.       7. There is mild cardiomegaly.       8. Extensive coronary artery calcifications are present.  The study was not tailored in order to   evaluate the coronary arteries.       9. There are acute bilateral anterior 3rd through 7th rib fractures, as discussed.       10. No pneumothorax.  No pneumomediastinum.       11. The patient is intubated.       12. Please see above comments for further detail.             Please note that portions of this note were completed with a voice recognition program.     GREG MARCELINO JR, MD         Electronically Signed and Approved By: GREG MARCELINO JR, MD on 12/06/2022 at 2:14                        CT Cervical Spine Without Contrast [[id number]] Collected: 12/06/22 0204     Updated: 12/06/22 0207    Narrative:      PROCEDURE: CT CERVICAL SPINE WO CONTRAST     COMPARISON: 3/3/2020.     INDICATIONS: neck pain     PROTOCOL:   Standard CT imaging protocol performed      RADIATION:   Total DLP: 1,653.4 mGy*cm    MA and/or KV were/was adjusted to minimize  radiation dose.      TECHNIQUE: After obtaining the patient's consent, multi-planar CT images were created without   contrast material.  There is artifact on the study.     EXAM FINDINGS:  A routine nonenhanced cervical spine CT was performed.  Sagittal and coronal two-dimensional   reformations are provided for review.  No acute cervical spine fracture or acute malalignment is   identified.  Small nonspecific bilateral cervical lymph nodes are seen.  There has been interval   anterior cervical discectomy and fusion (ACDF) at C5 through C7 with interbody prostheses seen at   C5-6 and C6-7.  There is near-anatomic alignment.  The hardware is intact.  No definite CT evidence   of hardware failure.  Mild-to-moderate degenerative changes involve the imaged spine, especially at   C1-2.  There is multiple-level facet and uncovertebral osteoarthritis.  Multiple-level neural   foraminal narrowing is suggested.  The patient is intubated.  Mild mucosal thickening involves the   imaged paranasal sinuses.  No air-fluid interfaces are seen within the imaged paranasal sinuses.    Transient intravenous gas is seen.  It may be iatrogenic in nature.       Impression:         No acute cervical spine fracture is seen.  No acute subluxation abnormality.  There are interval   postoperative changes of the cervical spine since the prior 3/3/2020 plain radiographic study of   the cervical spine.  Please see above comments for further detail.          Please note that portions of this note were completed with a voice recognition program.     GREG MARCELINO JR, MD         Electronically Signed and Approved By: GREG MARCELINO JR, MD on 12/06/2022 at 2:04                        CT Head Without Contrast [[id number]] Collected: 12/06/22 0158     Updated: 12/06/22 0201    Narrative:      PROCEDURE: CT HEAD WO CONTRAST     COMPARISON: 10/19/2022.     INDICATIONS: headache; history of cardiopulmonary arrest     PROTOCOL:   Standard CT imaging  protocol performed.      RADIATION:   Total DLP: 1,653.4 mGy*cm    MA and/or KV were/was adjusted to minimize radiation dose.       TECHNIQUE: After obtaining the patient's consent, 131 CT images were obtained without non-ionic   intravenous contrast material.      FINDINGS: The patient is intubated.  There is new diffuse lack of gray-white matter   differentiation, which is present in both the supratentorial and infratentorial regions.  The   findings are most consistent with acute anoxic brain injury or global hypoxic-ischemic injury.  No   acute intracranial hemorrhage.  No midline shift or acute intracranial herniation syndrome.  There   is cerebellar tonsillar ectopia, seen previously.  There may be mild chronic small vessel   ischemia/infarction.  No acute skull fracture is seen. Mild mucosal thickening involves the imaged   paranasal sinuses.  No air-fluid interfaces are seen within the imaged paranasal sinuses.  There is   hyperostosis frontalis interna.       Impression:       Acute anoxic brain injury is suggested by nonenhanced CT examination.  No acute   intracranial hemorrhage.  No midline shift or definite acute intracranial herniation syndrome.             Please note that portions of this note were completed with a voice recognition program.     GREG MARCELINO JR, MD         Electronically Signed and Approved By: GREG MARCELINO JR, MD on 12/06/2022 at 1:58                        CT Abdomen Pelvis With Contrast [[id number]] Resulted: 12/06/22 0048     Updated: 12/06/22 0051    XR Chest 1 View [[id number]] Collected: 12/05/22 2309     Updated: 12/05/22 2312    Narrative:      PROCEDURE: XR CHEST 1 VW     COMPARISON: 10/19/2022.     INDICATIONS: CODE BLUE/ ET TUBE PLACEMENT.     FINDINGS: A single AP supine portable chest radiograph is provided for review.  There has been   interval endotracheal (ET) intubation.  The distal tip of the endotracheal (ET) tube is projected   about 2.1 cm above the maykel.   It is in satisfactory position.  Diffuse bilateral infiltrates are   seen, probably greater on the right.  The findings may represent pulmonary edema (with vascular   congestion).  Infectious multifocal pneumonia is possible.  Aspiration pneumonia cannot be   excluded.  Minimal, if any, pleural effusion is suspected.  There are suspected old healed   right-sided rib fractures.  There is mild cardiac enlargement.  The thoracic aorta is   atherosclerotic.  External artifacts obscure detail.  There may be chronic calcified granulomatous   disease of the chest.  No pneumothorax is seen.  There are postoperative changes of the cervical   spine, as before.       Impression:            1. The endotracheal (ET) tube is in satisfactory position.       2. Bilateral infiltrates are seen.  The findings may represent infectious multifocal pneumonia.    Pulmonary edema is possible.       3. Minimal, if any, pleural effusion is suggested.       4. No pneumothorax.       5. There is mild cardiomegaly, seen previously.                 Please note that portions of this note were completed with a voice recognition program.     GREG MARCELINO JR, MD         Electronically Signed and Approved By: GREG MARCELINO JR, MD on 12/05/2022 at 23:08                               Assessment & Plan   Assessment / Plan     Assessment/Plan:   Cardiac arrest outside of hospital   Anoxic brain injury  Lactic acidosis  Anion gap metabolic acidosis  Shock  Bradycardia  Intermittent ventricular tachycardia  H/o coronary artery disease s/p Coronary stents    Plan  Admit to ICU  Intensivist consulted  Vent management as per the intensivist  Continue epinephrine as the patient was becoming bradycardic when tried to wean off of the epinephrine in the ED  Continue amiodarone drip   Check ABG every 4 hours  Check lactic acid every 4 hours  Check troponin every 4 hours x2  Follow-up on the CT chest abdomen pelvis, cervical spine  CT head was consistent with  anoxic brain injury, no intracranial hemorrhage  Palliative care consult in the a.m.  Poor prognosis due to cardiac arrest anoxic brain injury  Cardiology consult in the a.m.    DVT prophylaxis:  Mechanical DVT prophylaxis orders are present.    CODE STATUS:         Admission Status:  I believe this patient meets inpatient status.    Part of this note may be an electronic transcription/translation of spoken language to printed text using the Dragon Dictation System    Linda Cid MD               Electronically signed by Linda Cid MD at 12/06/22 0223          Emergency Department Notes      Freida Olivares MD at 12/05/22 2141      Procedure Orders    1. Intubation [[id number]] ordered by Freida Olivares MD               Time: 9:41 PM EST  Arrived by: ambulance  Chief Complaint:   Chief Complaint   Patient presents with   • Cardiac Arrest     History provided by: EMS  History is limited by: unresponsive    History of Present Illness:      Patient is a 62 y.o. year old female that presents to the emergency department via EMS after being found unresponsive in the bathtub by her .  heard a thump and ran into the bathroom and found her unresponsive. He started CPR.  called EMS around 8:48 pm. EMS reports 6 epi and 1 bicarb administered en route. 2 epi, 1 bicarb, and 1 CaCl was administered once pt arrived to hospital.  states that she had a heart cath last Thursday.  states that she was complaining of fatigue for the past several days, but denies any complaints of pain.   EMS reports an extensive cardiac history and states pt was scheduled for bypass in the next couple of weeks.       History provided by:  EMS personnel  History limited by:  Patient unresponsive      Similar Symptoms Previously: N/A  Recently seen: N/A      Patient Care Team  Primary Care Provider: Jordan Nixon MD    Past Medical History:     Allergies   Allergen Reactions   • Penicillins  Swelling     Past Medical History:   Diagnosis Date   • Anxiety    • Depression    • GERD (gastroesophageal reflux disease)    • Heart attack (HCC)    • Heart disease    • Hyperlipidemia    • Hypertension    • Hypokalemia    • Injury of back    • Sleep apnea      Past Surgical History:   Procedure Laterality Date   • CERVICAL SPINE SURGERY       No family history on file.    Home Medications:  Prior to Admission medications    Medication Sig Start Date End Date Taking? Authorizing Provider   albuterol (PROAIR RESPICLICK) 108 (90 Base) MCG/ACT inhaler Inhale 2 puffs Every 2 (Two) Hours As Needed.    Nory Ramirez MD   aspirin 81 MG EC tablet Take 1 tablet by mouth Daily.    Nory Ramirez MD   citalopram (CeleXA) 40 MG tablet Take 1 tablet by mouth Daily. 4/8/22   Nory Ramirez MD   furosemide (LASIX) 40 MG tablet Take 1 tablet by mouth Daily.    Nory Ramirez MD   lisinopril (PRINIVIL,ZESTRIL) 20 MG tablet Take 1 tablet by mouth Daily. 8/22/22   Nory Ramirez MD   LORazepam (ATIVAN) 1 MG tablet Take 1 tablet by mouth 3 (Three) Times a Day. 4/8/22   Nory Ramirez MD   metoprolol succinate XL (TOPROL-XL) 50 MG 24 hr tablet Take 1.5 tablets by mouth Daily. 4/15/22   Nory Ramirez MD   omeprazole OTC (PriLOSEC OTC) 20 MG EC tablet Take 1 tablet by mouth Daily.    Nory Ramirez MD   potassium chloride (K-DUR,KLOR-CON) 20 MEQ CR tablet Take 1 tablet by mouth 3 (Three) Times a Week. Mon, Wed, Fri    Nory Ramirez MD   prasugrel (EFFIENT) 10 MG tablet Take 1 tablet by mouth Daily. 4/8/22   Nory Ramirez MD   QUEtiapine (SEROquel) 25 MG tablet Take 1 tablet by mouth 2 (Two) Times a Day. 4/8/22   Nory Ramirez MD   ranolazine (RANEXA) 1000 MG 12 hr tablet Take 1 tablet by mouth 2 (Two) Times a Day. DO NOT CRUSH , CHEW OR SPLIT 4/8/22   Nory Ramirez MD   rosuvastatin (CRESTOR) 20 MG tablet Take 1 tablet by mouth Every Night. 7/19/22  "  ProviderNory MD   zolpidem (AMBIEN) 10 MG tablet Take 1 tablet by mouth As Needed. 4/8/22   ProviderNory MD        Social History:   Social History     Tobacco Use   • Smoking status: Former   • Smokeless tobacco: Never   Vaping Use   • Vaping Use: Never used   Substance Use Topics   • Alcohol use: Not Currently   • Drug use: Never     Recent travel: not applicable     Review of Systems:  Review of Systems   Unable to perform ROS: Patient unresponsive        Physical Exam:  /57   Pulse 85   Temp 98 °F (36.7 °C) (Rectal)   Resp 17   Ht 167.6 cm (65.98\")   Wt (!) 137 kg (300 lb 14.9 oz)   SpO2 97%   BMI 48.59 kg/m²      Appearance: Ongoing CPR.  Unresponsive.  No visible trauma.    Eyes: Pupils fixed and dilated.    Neck: Normal inspection.    CVS: Chest compressions performed.  No spontaneous pulse.   Respiratory: Ventilated.  No spontaneous respirations.    Abdomen: Soft.    Skin: Cyanosis.  Pallor.    Extremities: Lower extremity edema.    Neuro: Unresponsive.  No motor response to pain.               Medications in the Emergency Department:  Medications   EPINEPHrine (ADRENALIN) injection (1 mg Intravenous Given 12/5/22 2217)   calcium chloride injection (1 g Intravenous Given 12/5/22 2140)   sodium bicarbonate injection 8.4% (50 mEq Intravenous Given 12/5/22 2215)   sodium chloride 0.9 % flush 10 mL (has no administration in time range)   sodium chloride 0.9 % flush 10 mL (has no administration in time range)   amiodarone (CORDARONE) injection (150 mg Intravenous Given 12/5/22 2159)   Atropine Sulfate injection (1 mg Intravenous Given 12/5/22 2214)   sodium bicarbonate 150 mEq/1000 mL dextrose infusion (150 mEq Intravenous New Bag 12/5/22 2221)   Vasopressin (VASOSTRICT) 0.2 UNIT/ML solution (has no administration in time range)   EPINEPHrine 5 mg in 250 mL NS infusion (1 mcg/kg/min × 137 kg Intravenous New Bag 12/5/22 2340)   amiodarone in dextrose 5% (NEXTERONE) loading dose " 150mg/100mL (150 mg Intravenous New Bag 12/5/22 2241)     Followed by   amiodarone 360 mg in 200 mL D5W infusion (1 mg/min Intravenous New Bag 12/5/22 2241)     Followed by   amiodarone 360 mg in 200 mL D5W infusion (has no administration in time range)   vancomycin 2750 mg/500 mL 0.9% NS IVPB ([facility]) (has no administration in time range)   cefepime (MAXIPIME) IVPB 2 g (premix) in D5 (has no administration in time range)        Labs  Lab Results (last 24 hours)     Procedure Component Value Units Date/Time    ABG with Co-Ox and Electrolytes [[id number]]  (Abnormal) Collected: 12/05/22 2152    Specimen: Arterial Blood from Arm, Right Updated: 12/05/22 2154     pH, Arterial 6.816 pH units      pCO2, Arterial 92.6 mm Hg      pO2, Arterial 142.5 mm Hg      HCO3, Arterial 14.6 mmol/L      Base Excess, Arterial -20.8 mmol/L      O2 Saturation, Arterial 95.7 %      Hemoglobin, Blood Gas 12.7 g/dL      Carboxyhemoglobin 0.4 %      Methemoglobin 0.40 %      Oxyhemoglobin 94.9 %      FHHB 4.3 %      Cristobal's Test Positive     Note --     Site Arterial: right radial     Modality Resusitation Bag     FIO2 100 %      Flow Rate 15 lpm      Sodium, Arterial 144.4 mmol/L      Potassium, Arterial 4.30 mmol/L      Ionized Calcium, Arterial 1.39 mmol/L      Chloride, Arterial 104 mmol/L      Glucose, Arterial 258 mg/dL      Lactate, Arterial 13.36 mmol/L      PO2/FIO2 143    POC Troponin I [[id number]]  (Normal) Collected: 12/05/22 2154    Specimen: Blood Updated: 12/05/22 2205     Troponin I 0.02 ng/mL      Comment: Serial Number: [id number]:  [id number]       CBC & Differential [[id number]]  (Abnormal) Collected: 12/05/22 2155    Specimen: Blood Updated: 12/05/22 2313    Narrative:      The following orders were created for panel order CBC & Differential.  Procedure                               Abnormality         Status                     ---------                               -----------         ------                     CBC Auto  Differential[[id number]]        Abnormal            Final result               Scan Slide[[id number]]                                                                    Please view results for these tests on the individual orders.    Comprehensive Metabolic Panel [[id number]]  (Abnormal) Collected: 12/05/22 2155    Specimen: Blood Updated: 12/05/22 2247     Glucose 222 mg/dL      BUN 17 mg/dL      Creatinine 1.54 mg/dL      Sodium 143 mmol/L      Potassium 4.5 mmol/L      Comment: Slight hemolysis detected by analyzer. Results may be affected.        Chloride 101 mmol/L      CO2 19.5 mmol/L      Calcium 10.6 mg/dL      Total Protein 7.2 g/dL      Albumin 3.40 g/dL      ALT (SGPT) 22 U/L      AST (SGOT) 40 U/L      Alkaline Phosphatase 132 U/L      Total Bilirubin 0.4 mg/dL      Globulin 3.8 gm/dL      A/G Ratio 0.9 g/dL      BUN/Creatinine Ratio 11.0     Anion Gap 22.5 mmol/L      eGFR 38.0 mL/min/1.73      Comment: [employer] and American Society of Nephrology (ASN) Task Force recommended calculation based on the Chronic Kidney Disease Epidemiology Collaboration (CKD-EPI) equation refit without adjustment for race.       Narrative:      GFR Normal >60  Chronic Kidney Disease <60  Kidney Failure <15      BNP [[id number]]  (Abnormal) Collected: 12/05/22 2155    Specimen: Blood Updated: 12/05/22 2245     proBNP 1,913.0 pg/mL     Narrative:      Among patients with dyspnea, NT-proBNP is highly sensitive for the detection of acute congestive heart failure. In addition NT-proBNP of <300 pg/ml effectively rules out acute congestive heart failure with 99% negative predictive value.      Troponin [[id number]]  (Normal) Collected: 12/05/22 2155    Specimen: Blood Updated: 12/05/22 2247     Troponin T <0.010 ng/mL     Narrative:      Troponin T Reference Range:  <= 0.03 ng/mL-   Negative for AMI  >0.03 ng/mL-     Abnormal for myocardial necrosis.  Clinicians would have to utilize clinical acumen, EKG, Troponin and  serial changes to determine if it is an Acute Myocardial Infarction or myocardial injury due to an underlying chronic condition.       Results may be falsely decreased if patient taking Biotin.      Lactic Acid, Plasma [[id number]]  (Abnormal) Collected: 12/05/22 2155    Specimen: Blood Updated: 12/05/22 2306     Lactate 13.0 mmol/L     CBC Auto Differential [[id number]]  (Abnormal) Collected: 12/05/22 2155    Specimen: Blood Updated: 12/05/22 2313     WBC 9.76 10*3/mm3      RBC 4.26 10*6/mm3      Hemoglobin 12.2 g/dL      Hematocrit 42.3 %      MCV 99.3 fL      MCH 28.6 pg      MCHC 28.8 g/dL      RDW 16.0 %      RDW-SD 58.6 fl      MPV 11.8 fL      Platelets 228 10*3/mm3     POC Troponin I with Hold Tube [[id number]] Collected: 12/05/22 2155    Specimen: Blood Updated: 12/05/22 2228    Narrative:      The following orders were created for panel order POC Troponin I with Hold Tube.  Procedure                               Abnormality         Status                     ---------                               -----------         ------                     POC Troponin I[[id number]]                                                              HOLD Troponin-I Tube[[id number]]                             Final result                 Please view results for these tests on the individual orders.    Manual Differential [[id number]]  (Abnormal) Collected: 12/05/22 2155    Specimen: Blood Updated: 12/05/22 2313     Neutrophil % 50.0 %      Lymphocyte % 37.0 %      Eosinophil % 3.0 %      Bands %  8.0 %      Metamyelocyte % 2.0 %      Neutrophils Absolute 5.66 10*3/mm3      Lymphocytes Absolute 3.61 10*3/mm3      Eosinophils Absolute 0.29 10*3/mm3      nRBC 1.0 /100 WBC      Anisocytosis Slight/1+     Crenated RBC's Slight/1+     Macrocytes Slight/1+     WBC Morphology Normal     Platelet Estimate Adequate    Blood Culture - Blood, Arm, Left [[id number]] Collected: 12/05/22 2159    Specimen: Blood from Arm, Left Updated: 12/05/22  2219    Blood Culture - Blood, Arm, Left [[id number]] Collected: 12/05/22 2159    Specimen: Blood from Arm, Left Updated: 12/05/22 2217    Respiratory Culture - Sputum, ET Suction [[id number]] Collected: 12/05/22 2333    Specimen: Sputum from ET Suction Updated: 12/05/22 2345           Imaging:  XR Chest 1 View    Result Date: 12/5/2022  PROCEDURE: XR CHEST 1 VW  COMPARISON: 10/19/2022.  INDICATIONS: CODE BLUE/ ET TUBE PLACEMENT.  FINDINGS: A single AP supine portable chest radiograph is provided for review.  There has been interval endotracheal (ET) intubation.  The distal tip of the endotracheal (ET) tube is projected about 2.1 cm above the maykel.  It is in satisfactory position.  Diffuse bilateral infiltrates are seen, probably greater on the right.  The findings may represent pulmonary edema (with vascular congestion).  Infectious multifocal pneumonia is possible.  Aspiration pneumonia cannot be excluded.  Minimal, if any, pleural effusion is suspected.  There are suspected old healed right-sided rib fractures.  There is mild cardiac enlargement.  The thoracic aorta is atherosclerotic.  External artifacts obscure detail.  There may be chronic calcified granulomatous disease of the chest.  No pneumothorax is seen.  There are postoperative changes of the cervical spine, as before.         1. The endotracheal (ET) tube is in satisfactory position.   2. Bilateral infiltrates are seen.  The findings may represent infectious multifocal pneumonia.  Pulmonary edema is possible.   3. Minimal, if any, pleural effusion is suggested.   4. No pneumothorax.   5. There is mild cardiomegaly, seen previously.      Please note that portions of this note were completed with a voice recognition program.  GREG MARCELINO JR, MD       Electronically Signed and Approved By: GREG MARCELINO JR, MD on 12/05/2022 at 23:08                Procedures:  Intubation    Date/Time: 12/5/2022 11:51 PM  Performed by: Freida Olivares  MD  Authorized by: Freida Olivares MD     Consent:     Consent obtained:  Emergent situation  Universal protocol:     Patient identity confirmed:  Arm band  Pre-procedure details:     Indication: failure to oxygenate, failure to protect airway, failure to ventilate and predicted clinical deterioration      Patient status:  Unresponsive    Look externally: no concerns      Mouth opening - incisor distance:  2 finger widths    Hyoid-mental distance: 2 finger widths      Hyoid-thyroid distance: 2 or more finger widths      Mallampati score:  II    Obstruction: none      Neck mobility: normal      Pharmacologic strategy: none      Induction agents:  None    Paralytics:  None  Procedure details:     Preoxygenation:  Supraglottic device    CPR in progress: yes      Intubation method:  Oral    Intubation technique: video assisted      Laryngoscope blade:  Mac 4    Grade view: II      Tube size (mm):  7.5    Tube type:  Cuffed    Number of attempts:  1    Ventilation between attempts: no      Tube visualized through cords: yes    Placement assessment:     ETT at teeth/gumline (cm):  24    Tube secured with:  ETT kemp    Breath sounds:  Equal    Placement verification: chest rise, colorimetric ETCO2, CXR verification, direct visualization and equal breath sounds      CXR findings:  Appropriate position  Post-procedure details:     Procedure completion:  Tolerated        Progress  ED Course as of 12/06/22 0000   Mon Dec 05, 2022   2356 ECG 12 Lead Drug Monitoring; Amiodarone  EKG showed interventricular conduction delay with ST elevation in anterior leads.  Rate of 115.  Left bundle branch block morphology no definite P waves seen.  QTc prolonged.  Significant change when compared to previous.  EKG interpreted by me. [LD]   2356 ECG 12 Lead ED Triage Standing Order; SOA  EKG showed junctional rhythm with a rate of 57.  Diffuse ST depression.  No acute ST elevation.  Normal QTC.  Significant change when compared to  previous.  EKG interpreted by me. [LD]      ED Course User Index  [LD] Freida Olivares MD                            Medical Decision Making:  MDM  Number of Diagnoses or Management Options  Cardiac arrest (HCC)  Diagnosis management comments: Patient presented to the emergency department as a CODE BLUE.  On arrival CPR was in progress.  Patient has been undergoing CPR for approximately 40 minutes.  She received epi and bicarb by EMS.  On arrival she received several more doses of epinephrine, bicarb and calcium chloride.  Patient was intubated in the emergency department.  We were able to achieve ROSC while in the emergency department.  Initial EKG showed wide-complex and ST elevation anterior leads.  Repeat showed junctional rhythm with ST depression diffusely no acute ST elevation.  Patient does have an extensive cardiac history.  While in the emergency department patient had several episodes where heart rate and blood pressure dropped requiring CPR.  We were able to get ROSC fairly quick.  Patient is currently on epinephrine infusion as well as amiodarone.  Chest x-ray showed bilateral infiltrates concerning for pneumonia versus pulmonary edema.  Labs showed elevated lactic acid of 13.  Initial ABG showed a pH of 6.8.  PCO2 was 92 with a PO2 of 142.  Patient was also started on a bicarb infusion.  Patient's vitals seem to stabilize.  I discussed patient with hospitalist and she will be admitted to the ICU for further care.       Amount and/or Complexity of Data Reviewed  Clinical lab tests: ordered and reviewed  Tests in the radiology section of CPT®: ordered and reviewed  Review and summarize past medical records: yes  Discuss the patient with other providers: yes  Independent visualization of images, tracings, or specimens: yes    Risk of Complications, Morbidity, and/or Mortality  Presenting problems: high  Management options: high    Critical Care  Total time providing critical care: 30-74 minutes (I  spent 70 minutes providing critical care exclusive of procedures.   Time includes: direct patient care, patient reassessment, coordination of patient care, interpretation of data (laboratory data and imaging), review of patient's medical records, medical consultation, family consultation regarding treatment decisions and documentation of patient care.)       Final diagnoses:   Cardiac arrest (HCC)        Disposition:  ED Disposition     ED Disposition   Decision to Admit    Condition   --    Comment   Level of Care: Critical Care [6]   Diagnosis: Cardiac arrest (HCC) [427.5.ICD-9-CM]   Certification: I Certify That Inpatient Hospital Services Are Medically Necessary For Greater Than 2 Midnights               Please note that portions of this note were completed with a voice recognition program.     Documentation assistance provided by Annalee Barreto acting as scribe for Freida Olivares MD. Information recorded by the scribe was done at my direction and has been verified and validated by me.                Electronically signed by Freida Olivares MD at 12/06/22 0000       Physician Progress Notes (last 24 hours)  Notes from 12/05/22 1002 through 12/06/22 1002   No notes of this type exist for this encounter.         Consult Notes (last 24 hours)  Notes from 12/05/22 1002 through 12/06/22 1002   No notes of this type exist for this encounter.        Tracy Flores, RN   Registered Nurse  Nursing  Nursing Note     Signed  Date of Service:  12/06/22 0541  Creation Time:  12/06/22 0541     Signed        [facility]IC[facility] contacted for [facility]. Talked to Misty Staples. A coordinator will follow up today after deciding plan of care.

## 2022-12-06 NOTE — H&P
[facility] HISTORY AND PHYSICAL  Date: 2022   Patient Name: Lulú Barkley  : 1960  MRN: [id number]  Primary Care Physician:  Jordan Nixon MD  Date of admission: 2022    Subjective   Subjective     Chief Complaint: Cardiac arrest outside of hospital    HPI:    Lulú Barkley is a 62 y.o. female with a past medical history of coronary artery disease s/p 2 stents in LAD, 2 stents in the RCA in  and , scheduled for CABG in 2 weeks, hypertension, hyperlipidemia has been brought into the ED by EMS after cardiac arrest.  Patient was in the bathroom and then family members heard the thump and immediately went into the bathroom and found her to be unresponsive in the bathtub by her , he started the CPR and called the EMS around 8:48 PM.  She received 6 doses of epi and 1 bicarb in route to the hospital, 2 doses of epi 1 bicarb and 1 calcium chloride after she arrived to the hospital.  ROSC was achieved.    Patient is currently on max dose of epinephrine, vasopressin and also on amiodarone drip as she had few runs of tachycardia intermittently after the ROSC is achieved.  On initial labs, ABG 6.81/92/142, proBNP 1913, normal troponin.  Bicarb 19.5, anion gap 22.5, creatinine 1.54, ionized calcium 1.39, lactic acid 13, normal WBC.  Blood cultures were sent.  Chest x-ray showed bilateral infiltrates concerning for multifocal pneumonia versus pulmonary edema on seen.  Started on cefepime and vancomycin.  Initial EKG was concerning for ST elevations, following EKG did not show any significant ST elevations but mild ST depressions.    Patient recently had a cardiac cath done on last Thursday and is scheduled for CABG in 2 weeks.    Patient has been admitted to the critical care unit for postcardiac arrest care.      Personal History     Past Medical History:   Diagnosis Date   • Anxiety    • Depression    • GERD (gastroesophageal reflux disease)    • Heart attack (HCC)     • Heart disease    • Hyperlipidemia    • Hypertension    • Hypokalemia    • Injury of back    • Sleep apnea          Past Surgical History:   Procedure Laterality Date   • CERVICAL SPINE SURGERY           Family History   Family history unknown: Yes         Social History     Socioeconomic History   • Marital status:    Tobacco Use   • Smoking status: Former   • Smokeless tobacco: Never   Vaping Use   • Vaping Use: Never used   Substance and Sexual Activity   • Alcohol use: Not Currently   • Drug use: Never   • Sexual activity: Defer         Home Medications:  LORazepam, QUEtiapine, albuterol, aspirin, citalopram, furosemide, lisinopril, metoprolol succinate XL, omeprazole OTC, potassium chloride, prasugrel, ranolazine, rosuvastatin, and zolpidem    Allergies:  Allergies   Allergen Reactions   • Penicillins Swelling       Review of Systems   Unable to obtain review of systems due to patient's current medical condition/mentation    Objective   Objective     Vitals:   Temp:  [96.9 °F (36.1 °C)-98 °F (36.7 °C)] 96.9 °F (36.1 °C)  Heart Rate:  [] 89  Resp:  [17-24] 24  BP: ()/(26-68) 145/68  FiO2 (%):  [85 %-100 %] 85 %    Physical Exam    Constitutional: Obtunded   Eyes: Pupils equal, dilated and nonreactive   HENT: NCAT   Neck: Supple, no thyromegaly, no lymphadenopathy, trachea midline   Respiratory: Coarse breath sounds bilaterally present   Cardiovascular: RRR, no murmurs, rubs, or gallops, palpable pedal pulses bilaterally   Gastrointestinal: Positive bowel sounds, soft, nondistended   Musculoskeletal: No bilateral ankle edema, no clubbing or cyanosis to extremities   Neurologic: Obtunded, pupils dilated and nonreactive to light, not responding to noxious stimulus.   Skin: No rashes     Result Review    Result Review:  I have personally reviewed the results from the time of this admission to 12/6/2022 02:23 EST and agree with these findings:  [x]  Laboratory  [x]  Microbiology  [x]   Radiology  [x]  EKG/Telemetry   [x]  Cardiology/Vascular   []  Pathology  [x]  Old records  []  Other:    Imaging Results (Last 24 Hours)     Procedure Component Value Units Date/Time    CT Chest With Contrast Diagnostic [[id number]] Collected: 12/06/22 0214     Updated: 12/06/22 0217    Narrative:      PROCEDURE: CT CHEST W CONTRAST DIAGNOSTIC     COMPARISON: None.     INDICATIONS: 62-year-old female w/ shortness of breath; h/o recent cardiopulmonary arrest; the pt.   is intubated & on a ventilator.     TECHNIQUE: After obtaining the patient's consent, 851 CT/CTA images were obtained with non-ionic   intravenous contrast material.       PROTOCOL:   Standard CT/CTA imaging protocol performed.      RADIATION:   Automated exposure control was utilized to minimize radiation dose.      FINDINGS: There are acute nondisplaced (or minimally displaced) extra-articular closed bilateral   anterior/anterolateral 3rd, 4th, 5th, 6th, and 7th rib fractures.  No pneumothorax is seen.  No   acute sternal fracture is suggested.  No other definite acute fractures are appreciated.  Transient   intravenous gas is suggested, such as seen in the left anterior paramidline chest wall, as on image   43 of series 501 and adjacent images.  Again, the patient is intubated.  Respiratory artifact   obscures detail.  There are small bilateral pleural effusions.  Diffuse bilateral infiltrates are   seen.  The findings may represent pulmonary edema.  Infectious multifocal pneumonia is possible.    Aspiration pneumonia cannot be excluded.  There is also atelectasis, especially in the dependent   portions of the bilateral lungs.  There is a small to moderate sized pericardial effusion.  It has   CT numbers ranging between approximately 25 and 101 Hounsfield units.  Mild cardiomegaly is   suspected.  There are extensive coronary artery calcifications.  The study was not tailored in   order to evaluate the coronary arteries.  There are degenerative  changes throughout the imaged   spine.  There are small possibly reactive mediastinal lymph nodes.  No definite pulmonary embolism   is seen.  Grossly, no thoracic aortic aneurysm or dissection is suggested.  Grossly, no definite   acute abnormality of the great arteries.  The patient's upper extremities in the scan field of view   obscure detail.  External artifacts also obscure detail.  Again, there is motion artifact on the   study.  The study is also limited due to large body habitus.  The best possible images were   obtained, considering the patient's condition and body habitus.  Please see the separately dictated   abdominal/pelvic CT exam report for further detail regarding findings in the abdomen and pelvis.         Impression:            1. The study is limited, as discussed.       2. No definite pulmonary embolism is seen.       3. No definite thoracic aortic aneurysm or dissection.       4. Diffuse bilateral infiltrates are seen, which may represent pulmonary edema.  Infectious   multifocal pneumonia is possible.       5. There are small bilateral pleural effusions.       6. There is a small-to-moderate-sized pericardial effusion.       7. There is mild cardiomegaly.       8. Extensive coronary artery calcifications are present.  The study was not tailored in order to   evaluate the coronary arteries.       9. There are acute bilateral anterior 3rd through 7th rib fractures, as discussed.       10. No pneumothorax.  No pneumomediastinum.       11. The patient is intubated.       12. Please see above comments for further detail.             Please note that portions of this note were completed with a voice recognition program.     GREG MARCELINO JR, MD         Electronically Signed and Approved By: GREG MARCELINO JR, MD on 12/06/2022 at 2:14                        CT Cervical Spine Without Contrast [[id number]] Collected: 12/06/22 0204     Updated: 12/06/22 0207    Narrative:      PROCEDURE: CT CERVICAL  SPINE WO CONTRAST     COMPARISON: 3/3/2020.     INDICATIONS: neck pain     PROTOCOL:   Standard CT imaging protocol performed      RADIATION:   Total DLP: 1,653.4 mGy*cm    MA and/or KV were/was adjusted to minimize radiation dose.      TECHNIQUE: After obtaining the patient's consent, multi-planar CT images were created without   contrast material.  There is artifact on the study.     EXAM FINDINGS:  A routine nonenhanced cervical spine CT was performed.  Sagittal and coronal two-dimensional   reformations are provided for review.  No acute cervical spine fracture or acute malalignment is   identified.  Small nonspecific bilateral cervical lymph nodes are seen.  There has been interval   anterior cervical discectomy and fusion (ACDF) at C5 through C7 with interbody prostheses seen at   C5-6 and C6-7.  There is near-anatomic alignment.  The hardware is intact.  No definite CT evidence   of hardware failure.  Mild-to-moderate degenerative changes involve the imaged spine, especially at   C1-2.  There is multiple-level facet and uncovertebral osteoarthritis.  Multiple-level neural   foraminal narrowing is suggested.  The patient is intubated.  Mild mucosal thickening involves the   imaged paranasal sinuses.  No air-fluid interfaces are seen within the imaged paranasal sinuses.    Transient intravenous gas is seen.  It may be iatrogenic in nature.       Impression:         No acute cervical spine fracture is seen.  No acute subluxation abnormality.  There are interval   postoperative changes of the cervical spine since the prior 3/3/2020 plain radiographic study of   the cervical spine.  Please see above comments for further detail.          Please note that portions of this note were completed with a voice recognition program.     GREG MARCELINO JR, MD         Electronically Signed and Approved By: GREG MARCELINO JR, MD on 12/06/2022 at 2:04                        CT Head Without Contrast [[id number]] Collected:  12/06/22 0158     Updated: 12/06/22 0201    Narrative:      PROCEDURE: CT HEAD WO CONTRAST     COMPARISON: 10/19/2022.     INDICATIONS: headache; history of cardiopulmonary arrest     PROTOCOL:   Standard CT imaging protocol performed.      RADIATION:   Total DLP: 1,653.4 mGy*cm    MA and/or KV were/was adjusted to minimize radiation dose.       TECHNIQUE: After obtaining the patient's consent, 131 CT images were obtained without non-ionic   intravenous contrast material.      FINDINGS: The patient is intubated.  There is new diffuse lack of gray-white matter   differentiation, which is present in both the supratentorial and infratentorial regions.  The   findings are most consistent with acute anoxic brain injury or global hypoxic-ischemic injury.  No   acute intracranial hemorrhage.  No midline shift or acute intracranial herniation syndrome.  There   is cerebellar tonsillar ectopia, seen previously.  There may be mild chronic small vessel   ischemia/infarction.  No acute skull fracture is seen. Mild mucosal thickening involves the imaged   paranasal sinuses.  No air-fluid interfaces are seen within the imaged paranasal sinuses.  There is   hyperostosis frontalis interna.       Impression:       Acute anoxic brain injury is suggested by nonenhanced CT examination.  No acute   intracranial hemorrhage.  No midline shift or definite acute intracranial herniation syndrome.             Please note that portions of this note were completed with a voice recognition program.     GREG MARCELINO JR, MD         Electronically Signed and Approved By: GREG MARCELINO JR, MD on 12/06/2022 at 1:58                        CT Abdomen Pelvis With Contrast [[id number]] Resulted: 12/06/22 0048     Updated: 12/06/22 0051    XR Chest 1 View [[id number]] Collected: 12/05/22 2309     Updated: 12/05/22 2312    Narrative:      PROCEDURE: XR CHEST 1 VW     COMPARISON: 10/19/2022.     INDICATIONS: CODE BLUE/ ET TUBE PLACEMENT.     FINDINGS: A  single AP supine portable chest radiograph is provided for review.  There has been   interval endotracheal (ET) intubation.  The distal tip of the endotracheal (ET) tube is projected   about 2.1 cm above the maykel.  It is in satisfactory position.  Diffuse bilateral infiltrates are   seen, probably greater on the right.  The findings may represent pulmonary edema (with vascular   congestion).  Infectious multifocal pneumonia is possible.  Aspiration pneumonia cannot be   excluded.  Minimal, if any, pleural effusion is suspected.  There are suspected old healed   right-sided rib fractures.  There is mild cardiac enlargement.  The thoracic aorta is   atherosclerotic.  External artifacts obscure detail.  There may be chronic calcified granulomatous   disease of the chest.  No pneumothorax is seen.  There are postoperative changes of the cervical   spine, as before.       Impression:            1. The endotracheal (ET) tube is in satisfactory position.       2. Bilateral infiltrates are seen.  The findings may represent infectious multifocal pneumonia.    Pulmonary edema is possible.       3. Minimal, if any, pleural effusion is suggested.       4. No pneumothorax.       5. There is mild cardiomegaly, seen previously.                 Please note that portions of this note were completed with a voice recognition program.     GREG MARCELINO JR, MD         Electronically Signed and Approved By: GREG MARCELINO JR, MD on 12/05/2022 at 23:08                               Assessment & Plan   Assessment / Plan     Assessment/Plan:   Cardiac arrest outside of hospital   Anoxic brain injury  Lactic acidosis  Anion gap metabolic acidosis  Shock  Bradycardia  Intermittent ventricular tachycardia  H/o coronary artery disease s/p Coronary stents    Plan  Admit to ICU  Intensivist consulted  Vent management as per the intensivist  Continue epinephrine as the patient was becoming bradycardic when tried to wean off of the epinephrine  in the ED  Continue amiodarone drip   Check ABG every 4 hours  Check lactic acid every 4 hours  Check troponin every 4 hours x2  Follow-up on the CT chest abdomen pelvis, cervical spine  CT head was consistent with anoxic brain injury, no intracranial hemorrhage  Palliative care consult in the a.m.  Poor prognosis due to cardiac arrest anoxic brain injury  Cardiology consult in the a.m.    DVT prophylaxis:  Mechanical DVT prophylaxis orders are present.    CODE STATUS:         Admission Status:  I believe this patient meets inpatient status.    Part of this note may be an electronic transcription/translation of spoken language to printed text using the Dragon Dictation System    Linda Cid MD

## 2022-12-06 NOTE — PLAN OF CARE
Goal Outcome Evaluation:  Plan of Care Reviewed With: spouse, daughter        Progress: no change     PT bp maintained on levo, vaso, epi. Pt is also on heparin, amio, and dobutamine. Pt is having a difficult time maintaining her O2 sats above 90 despite being on vent at 100%. Pulse and temperature WNL. Pt has had no urine output this shift. Family has decided to continue care throughout the night and move to comfort care after morning rounds. Family questions and concerns addressed.

## 2022-12-06 NOTE — NURSING NOTE
Met with patients family at bedside. Discussed patients condition and poor prognosis. Patients family emotional. Discussed code status and comfort measures only/withdraw of care. At this time, patients family requests no cpr. Continue supportive care while family makes further decisions regarding transition to comfort measures only. Provided family with palliative care contact information and encouraged to call with further questions/concerns.    KELLEE GrangerN, RN  Palliative Care

## 2022-12-06 NOTE — NURSING NOTE
[facility] contacted for [facility]. Talked to Misty Staples. A coordinator will follow up today after deciding plan of care.

## 2022-12-06 NOTE — CONSULTS
[facility]   Cardiology Consult Note    Patient Name: Lulú Barkley  : 1960  MRN: [id number]  Primary Care Physician:  Jordan Nixon MD  Referring Physician: No Known Provider  Date of admission: 2022    Subjective   Subjective     Reason for Consult/ Chief Complaint: Cardiac arrest    HPI:  Lulú Barkley is a 62 y.o. female with CAD, status post multivessel PCI, status post cardiac catheterization by Dr. Evans at [facility] last week which demonstrated recurrent multivessel CAD, she was planned to undergo CABG in about 2 weeks time, was brought to the emergency room by EMS after cardiac arrest.  Patient is currently intubated.  Family was obtained from her  at bedside.  Her  heard a thump and immediately went to the bathroom and found her unresponsive.  He started CPR and called EMS.  CPR was resumed and she received multiple rounds of epinephrine and bicarb before achieving ROSC.   is unsure about the duration of CPR.     Patient has been complaining of progressive shortness of breath which started about a month ago.  She was not having chest pain per se.  Apart from the shortness of breath, she was not having any usual or new symptoms.    Review of Systems   Could not be obtained    Personal History     Past Medical History:   Diagnosis Date   • Anxiety    • Depression    • GERD (gastroesophageal reflux disease)    • Heart attack (HCC)    • Heart disease    • Hyperlipidemia    • Hypertension    • Hypokalemia    • Injury of back    • Sleep apnea         Family History: Family history is unknown by patient. Otherwise pertinent FHx was reviewed and not pertinent to current issue.    Social History:  reports that she has quit smoking. She has never used smokeless tobacco. She reports that she does not currently use alcohol. She reports that she does not use drugs.    Home Medications:  LORazepam, QUEtiapine, albuterol, aspirin, citalopram, furosemide,  lisinopril, metoprolol succinate XL, omeprazole OTC, potassium chloride, prasugrel, ranolazine, rosuvastatin, and zolpidem    Allergies:  Allergies   Allergen Reactions   • Penicillins Swelling       Objective    Objective     Vitals:   Temp:  [96.9 °F (36.1 °C)-98.4 °F (36.9 °C)] 98.4 °F (36.9 °C)  Heart Rate:  [] 72  Resp:  [17-24] 23  BP: ()/(26-72) 120/51  FiO2 (%):  [85 %-100 %] 100 %      Physical Exam:   Constitutional: Intubated, nonresponsive   Eyes: Pupils fixed and dilated, sclerae anicteric, no conjunctival injection   HENT: NCAT, mucous membranes moist   Neck: Supple, no thyromegaly, no lymphadenopathy, trachea midline   Respiratory: Clear to auscultation bilaterally, nonlabored respirations    Cardiovascular: RRR, no murmurs, rubs, or gallops, palpable pedal pulses bilaterally   Gastrointestinal: Positive bowel sounds, soft, nontender, nondistended   Musculoskeletal: No bilateral ankle edema, no clubbing or cyanosis to extremities   Psychiatric: Appropriate affect, cooperative   Neurologic: Not responsive to painful stimuli, vocal stimuli.  Absent gag and pupils reflex.   Skin: No rashes     Result Review    Result Review:  I have personally reviewed the results from the time of this admission to 12/6/2022 10:01 EST and agree with these findings:  [x]  Laboratory  []  Microbiology  [x]  Radiology  [x]  EKG/Telemetry   [x]  Cardiology/Vascular   []  Pathology  [x]  Old records  []  Other:  Most notable findings include:     CMP    CMP 11/8/22 12/5/22 12/5/22 12/5/22 12/6/22 12/6/22     2152 2155 2155 0108 0759   Glucose 90 258 (A) 222 (A) 231 (A) 257 (A) 228 (A)   BUN 11  17 18     Creatinine 0.97  1.54 (A) 1.53 (A)     Sodium 139 144.4 143 142 142.5 143.3   Potassium 4.0  4.5 4.7     Chloride 101  101 100     Calcium 9.2  10.6 (A) 10.7 (A)     Albumin   3.40 (A) 3.50     Total Bilirubin   0.4 0.4     Alkaline Phosphatase   132 (A) 134 (A)     AST (SGOT)   40 (A) 42 (A)     ALT (SGPT)   22  24     (A) Abnormal value       Comments are available for some flowsheets but are not being displayed.            CBC    CBC 10/22/22 12/5/22 12/6/22   WBC 8.79 9.76 16.83 (A)   RBC 3.66 (A) 4.26 3.41 (A)   Hemoglobin 11.1 (A) 12.2 9.6 (A)   Hematocrit 34.1 42.3 35.9   MCV 93.2 99.3 (A) 105.3 (A)   MCH 30.3 28.6 28.2   MCHC 32.6 28.8 (A) 26.7 (A)   RDW 13.2 16.0 (A) 15.9 (A)   Platelets 192 228 241   (A) Abnormal value             Lab Results   Component Value Date    TROPONINT 0.161 (C) 12/06/2022         Assessment & Plan   Assessment / Plan     Brief Patient Summary:  Lulú Barkley is a 62 y.o. female with:    -Cardiopulmonary arrest, currently intubated, on epinephrine and vasopressin.  Presenting ECG shows sinus tachycardia with left bundle branch block.  Repeat ECG from this morning shows sinus/junctional with diffuse nonspecific ST-T changes.  -Acute anoxic brain injury as suggested by physical exam and CT findings.  -Concern for possible brain death.  -Multivessel coronary disease, status post multivessel previous PCI with recent catheterization demonstrating recurrent CAD, was planned to undergo CABG in 2 weeks.  -Anion gap metabolic acidosis.  -Intermittent ventricular tachycardia, on amiodarone infusion.  -Elevated creatinine.  -Morbid obesity    Plan:   -Neurology evaluation for anoxic brain injury.  -Continue hemodynamic support.  -Consider transitioning epinephrine/vasopressin to Levophed +/- dobutamine.  -Vent management as per pulmonary/ICU.  -Echocardiogram was ordered, will review.  -She will be started on medical therapy for non-STEMI pending further neurology evaluation.  -Reduce amiodarone infusion rate to 0.25 mg/min after completing 24 hours.  -May use intermittent IV diuresis for fluid overload.    Patient is critically ill with high risk of demise.    Discussed with family and RN at bedside.    Thank for the consultation.  Please call with any questions or concerns.    Electronically  signed by Hiral Schmidt MD, 12/06/22, 10:01 AM EST.

## 2022-12-06 NOTE — ED PROVIDER NOTES
Time: 9:41 PM EST  Arrived by: ambulance  Chief Complaint:   Chief Complaint   Patient presents with   • Cardiac Arrest     History provided by: EMS  History is limited by: unresponsive    History of Present Illness:      Patient is a 62 y.o. year old female that presents to the emergency department via EMS after being found unresponsive in the bathtub by her .  heard a thump and ran into the bathroom and found her unresponsive. He started CPR.  called EMS around 8:48 pm. EMS reports 6 epi and 1 bicarb administered en route. 2 epi, 1 bicarb, and 1 CaCl was administered once pt arrived to hospital.  states that she had a heart cath last Thursday.  states that she was complaining of fatigue for the past several days, but denies any complaints of pain.   EMS reports an extensive cardiac history and states pt was scheduled for bypass in the next couple of weeks.       History provided by:  EMS personnel  History limited by:  Patient unresponsive      Similar Symptoms Previously: N/A  Recently seen: N/A      Patient Care Team  Primary Care Provider: Jordan Nixon MD    Past Medical History:     Allergies   Allergen Reactions   • Penicillins Swelling     Past Medical History:   Diagnosis Date   • Anxiety    • Depression    • GERD (gastroesophageal reflux disease)    • Heart attack (HCC)    • Heart disease    • Hyperlipidemia    • Hypertension    • Hypokalemia    • Injury of back    • Sleep apnea      Past Surgical History:   Procedure Laterality Date   • CERVICAL SPINE SURGERY       No family history on file.    Home Medications:  Prior to Admission medications    Medication Sig Start Date End Date Taking? Authorizing Provider   albuterol (PROAIR RESPICLICK) 108 (90 Base) MCG/ACT inhaler Inhale 2 puffs Every 2 (Two) Hours As Needed.    Provider, MD Nory   aspirin 81 MG EC tablet Take 1 tablet by mouth Daily.    ProviderNory MD   citalopram (CeleXA) 40 MG tablet Take  "1 tablet by mouth Daily. 4/8/22   Nory Ramirez MD   furosemide (LASIX) 40 MG tablet Take 1 tablet by mouth Daily.    Nory Ramirze MD   lisinopril (PRINIVIL,ZESTRIL) 20 MG tablet Take 1 tablet by mouth Daily. 8/22/22   Nory Ramirez MD   LORazepam (ATIVAN) 1 MG tablet Take 1 tablet by mouth 3 (Three) Times a Day. 4/8/22   Nory Ramirez MD   metoprolol succinate XL (TOPROL-XL) 50 MG 24 hr tablet Take 1.5 tablets by mouth Daily. 4/15/22   Nory Ramirez MD   omeprazole OTC (PriLOSEC OTC) 20 MG EC tablet Take 1 tablet by mouth Daily.    Nory Ramirez MD   potassium chloride (K-DUR,KLOR-CON) 20 MEQ CR tablet Take 1 tablet by mouth 3 (Three) Times a Week. Mon, Wed, Fri    Nory Ramirez MD   prasugrel (EFFIENT) 10 MG tablet Take 1 tablet by mouth Daily. 4/8/22   Nory Ramirez MD   QUEtiapine (SEROquel) 25 MG tablet Take 1 tablet by mouth 2 (Two) Times a Day. 4/8/22   Nory Ramirez MD   ranolazine (RANEXA) 1000 MG 12 hr tablet Take 1 tablet by mouth 2 (Two) Times a Day. DO NOT CRUSH , CHEW OR SPLIT 4/8/22   Nory Ramirez MD   rosuvastatin (CRESTOR) 20 MG tablet Take 1 tablet by mouth Every Night. 7/19/22   Nory Ramirez MD   zolpidem (AMBIEN) 10 MG tablet Take 1 tablet by mouth As Needed. 4/8/22   Nory Ramirez MD        Social History:   Social History     Tobacco Use   • Smoking status: Former   • Smokeless tobacco: Never   Vaping Use   • Vaping Use: Never used   Substance Use Topics   • Alcohol use: Not Currently   • Drug use: Never     Recent travel: not applicable     Review of Systems:  Review of Systems   Unable to perform ROS: Patient unresponsive        Physical Exam:  /57   Pulse 85   Temp 98 °F (36.7 °C) (Rectal)   Resp 17   Ht 167.6 cm (65.98\")   Wt (!) 137 kg (300 lb 14.9 oz)   SpO2 97%   BMI 48.59 kg/m²      Appearance: Ongoing CPR.  Unresponsive.  No visible trauma.    Eyes: Pupils fixed and dilated.  "   Neck: Normal inspection.    CVS: Chest compressions performed.  No spontaneous pulse.   Respiratory: Ventilated.  No spontaneous respirations.    Abdomen: Soft.    Skin: Cyanosis.  Pallor.    Extremities: Lower extremity edema.    Neuro: Unresponsive.  No motor response to pain.                Medications in the Emergency Department:  Medications   EPINEPHrine (ADRENALIN) injection (1 mg Intravenous Given 12/5/22 2217)   calcium chloride injection (1 g Intravenous Given 12/5/22 2140)   sodium bicarbonate injection 8.4% (50 mEq Intravenous Given 12/5/22 2215)   sodium chloride 0.9 % flush 10 mL (has no administration in time range)   sodium chloride 0.9 % flush 10 mL (has no administration in time range)   amiodarone (CORDARONE) injection (150 mg Intravenous Given 12/5/22 2159)   Atropine Sulfate injection (1 mg Intravenous Given 12/5/22 2214)   sodium bicarbonate 150 mEq/1000 mL dextrose infusion (150 mEq Intravenous New Bag 12/5/22 2221)   Vasopressin (VASOSTRICT) 0.2 UNIT/ML solution (has no administration in time range)   EPINEPHrine 5 mg in 250 mL NS infusion (1 mcg/kg/min × 137 kg Intravenous New Bag 12/5/22 2340)   amiodarone in dextrose 5% (NEXTERONE) loading dose 150mg/100mL (150 mg Intravenous New Bag 12/5/22 2241)     Followed by   amiodarone 360 mg in 200 mL D5W infusion (1 mg/min Intravenous New Bag 12/5/22 2241)     Followed by   amiodarone 360 mg in 200 mL D5W infusion (has no administration in time range)   vancomycin 2750 mg/500 mL 0.9% NS IVPB ([facility]) (has no administration in time range)   cefepime (MAXIPIME) IVPB 2 g (premix) in D5 (has no administration in time range)        Labs  Lab Results (last 24 hours)     Procedure Component Value Units Date/Time    ABG with Co-Ox and Electrolytes [[id number]]  (Abnormal) Collected: 12/05/22 2152    Specimen: Arterial Blood from Arm, Right Updated: 12/05/22 2154     pH, Arterial 6.816 pH units      pCO2, Arterial 92.6 mm Hg      pO2, Arterial 142.5 mm Hg       HCO3, Arterial 14.6 mmol/L      Base Excess, Arterial -20.8 mmol/L      O2 Saturation, Arterial 95.7 %      Hemoglobin, Blood Gas 12.7 g/dL      Carboxyhemoglobin 0.4 %      Methemoglobin 0.40 %      Oxyhemoglobin 94.9 %      FHHB 4.3 %      Cristobal's Test Positive     Note --     Site Arterial: right radial     Modality Resusitation Bag     FIO2 100 %      Flow Rate 15 lpm      Sodium, Arterial 144.4 mmol/L      Potassium, Arterial 4.30 mmol/L      Ionized Calcium, Arterial 1.39 mmol/L      Chloride, Arterial 104 mmol/L      Glucose, Arterial 258 mg/dL      Lactate, Arterial 13.36 mmol/L      PO2/FIO2 143    POC Troponin I [[id number]]  (Normal) Collected: 12/05/22 2154    Specimen: Blood Updated: 12/05/22 2205     Troponin I 0.02 ng/mL      Comment: Serial Number: [id number]:  [id number]       CBC & Differential [[id number]]  (Abnormal) Collected: 12/05/22 2155    Specimen: Blood Updated: 12/05/22 2313    Narrative:      The following orders were created for panel order CBC & Differential.  Procedure                               Abnormality         Status                     ---------                               -----------         ------                     CBC Auto Differential[[id number]]        Abnormal            Final result               Scan Slide[[id number]]                                                                    Please view results for these tests on the individual orders.    Comprehensive Metabolic Panel [[id number]]  (Abnormal) Collected: 12/05/22 2155    Specimen: Blood Updated: 12/05/22 2247     Glucose 222 mg/dL      BUN 17 mg/dL      Creatinine 1.54 mg/dL      Sodium 143 mmol/L      Potassium 4.5 mmol/L      Comment: Slight hemolysis detected by analyzer. Results may be affected.        Chloride 101 mmol/L      CO2 19.5 mmol/L      Calcium 10.6 mg/dL      Total Protein 7.2 g/dL      Albumin 3.40 g/dL      ALT (SGPT) 22 U/L      AST (SGOT) 40 U/L      Alkaline Phosphatase 132 U/L       Total Bilirubin 0.4 mg/dL      Globulin 3.8 gm/dL      A/G Ratio 0.9 g/dL      BUN/Creatinine Ratio 11.0     Anion Gap 22.5 mmol/L      eGFR 38.0 mL/min/1.73      Comment: National Kidney Foundation and American Society of Nephrology (ASN) Task Force recommended calculation based on the Chronic Kidney Disease Epidemiology Collaboration (CKD-EPI) equation refit without adjustment for race.       Narrative:      GFR Normal >60  Chronic Kidney Disease <60  Kidney Failure <15      BNP [[id number]]  (Abnormal) Collected: 12/05/22 2155    Specimen: Blood Updated: 12/05/22 2245     proBNP 1,913.0 pg/mL     Narrative:      Among patients with dyspnea, NT-proBNP is highly sensitive for the detection of acute congestive heart failure. In addition NT-proBNP of <300 pg/ml effectively rules out acute congestive heart failure with 99% negative predictive value.      Troponin [[id number]]  (Normal) Collected: 12/05/22 2155    Specimen: Blood Updated: 12/05/22 2247     Troponin T <0.010 ng/mL     Narrative:      Troponin T Reference Range:  <= 0.03 ng/mL-   Negative for AMI  >0.03 ng/mL-     Abnormal for myocardial necrosis.  Clinicians would have to utilize clinical acumen, EKG, Troponin and serial changes to determine if it is an Acute Myocardial Infarction or myocardial injury due to an underlying chronic condition.       Results may be falsely decreased if patient taking Biotin.      Lactic Acid, Plasma [[id number]]  (Abnormal) Collected: 12/05/22 2155    Specimen: Blood Updated: 12/05/22 2306     Lactate 13.0 mmol/L     CBC Auto Differential [[id number]]  (Abnormal) Collected: 12/05/22 2155    Specimen: Blood Updated: 12/05/22 2313     WBC 9.76 10*3/mm3      RBC 4.26 10*6/mm3      Hemoglobin 12.2 g/dL      Hematocrit 42.3 %      MCV 99.3 fL      MCH 28.6 pg      MCHC 28.8 g/dL      RDW 16.0 %      RDW-SD 58.6 fl      MPV 11.8 fL      Platelets 228 10*3/mm3     POC Troponin I with Hold Tube [[id number]] Collected: 12/05/22 2155     Specimen: Blood Updated: 12/05/22 2228    Narrative:      The following orders were created for panel order POC Troponin I with Hold Tube.  Procedure                               Abnormality         Status                     ---------                               -----------         ------                     POC Troponin I[[id number]]                                                              HOLD Troponin-I Tube[[id number]]                             Final result                 Please view results for these tests on the individual orders.    Manual Differential [[id number]]  (Abnormal) Collected: 12/05/22 2155    Specimen: Blood Updated: 12/05/22 2313     Neutrophil % 50.0 %      Lymphocyte % 37.0 %      Eosinophil % 3.0 %      Bands %  8.0 %      Metamyelocyte % 2.0 %      Neutrophils Absolute 5.66 10*3/mm3      Lymphocytes Absolute 3.61 10*3/mm3      Eosinophils Absolute 0.29 10*3/mm3      nRBC 1.0 /100 WBC      Anisocytosis Slight/1+     Crenated RBC's Slight/1+     Macrocytes Slight/1+     WBC Morphology Normal     Platelet Estimate Adequate    Blood Culture - Blood, Arm, Left [[id number]] Collected: 12/05/22 2159    Specimen: Blood from Arm, Left Updated: 12/05/22 2219    Blood Culture - Blood, Arm, Left [[id number]] Collected: 12/05/22 2159    Specimen: Blood from Arm, Left Updated: 12/05/22 2217    Respiratory Culture - Sputum, ET Suction [[id number]] Collected: 12/05/22 2333    Specimen: Sputum from ET Suction Updated: 12/05/22 2341           Imaging:  XR Chest 1 View    Result Date: 12/5/2022  PROCEDURE: XR CHEST 1 VW  COMPARISON: 10/19/2022.  INDICATIONS: CODE BLUE/ ET TUBE PLACEMENT.  FINDINGS: A single AP supine portable chest radiograph is provided for review.  There has been interval endotracheal (ET) intubation.  The distal tip of the endotracheal (ET) tube is projected about 2.1 cm above the maykel.  It is in satisfactory position.  Diffuse bilateral infiltrates are seen, probably greater on the  right.  The findings may represent pulmonary edema (with vascular congestion).  Infectious multifocal pneumonia is possible.  Aspiration pneumonia cannot be excluded.  Minimal, if any, pleural effusion is suspected.  There are suspected old healed right-sided rib fractures.  There is mild cardiac enlargement.  The thoracic aorta is atherosclerotic.  External artifacts obscure detail.  There may be chronic calcified granulomatous disease of the chest.  No pneumothorax is seen.  There are postoperative changes of the cervical spine, as before.         1. The endotracheal (ET) tube is in satisfactory position.   2. Bilateral infiltrates are seen.  The findings may represent infectious multifocal pneumonia.  Pulmonary edema is possible.   3. Minimal, if any, pleural effusion is suggested.   4. No pneumothorax.   5. There is mild cardiomegaly, seen previously.      Please note that portions of this note were completed with a voice recognition program.  GREG MARCELINO JR, MD       Electronically Signed and Approved By: GREG MARCELINO JR, MD on 12/05/2022 at 23:08                Procedures:  Intubation    Date/Time: 12/5/2022 11:51 PM  Performed by: Freida Olivares MD  Authorized by: Freida Olivares MD     Consent:     Consent obtained:  Emergent situation  Universal protocol:     Patient identity confirmed:  Arm band  Pre-procedure details:     Indication: failure to oxygenate, failure to protect airway, failure to ventilate and predicted clinical deterioration      Patient status:  Unresponsive    Look externally: no concerns      Mouth opening - incisor distance:  2 finger widths    Hyoid-mental distance: 2 finger widths      Hyoid-thyroid distance: 2 or more finger widths      Mallampati score:  II    Obstruction: none      Neck mobility: normal      Pharmacologic strategy: none      Induction agents:  None    Paralytics:  None  Procedure details:     Preoxygenation:  Supraglottic device    CPR in progress:  yes      Intubation method:  Oral    Intubation technique: video assisted      Laryngoscope blade:  Mac 4    Grade view: II      Tube size (mm):  7.5    Tube type:  Cuffed    Number of attempts:  1    Ventilation between attempts: no      Tube visualized through cords: yes    Placement assessment:     ETT at teeth/gumline (cm):  24    Tube secured with:  ETT kemp    Breath sounds:  Equal    Placement verification: chest rise, colorimetric ETCO2, CXR verification, direct visualization and equal breath sounds      CXR findings:  Appropriate position  Post-procedure details:     Procedure completion:  Tolerated        Progress  ED Course as of 12/06/22 0000   Mon Dec 05, 2022   2356 ECG 12 Lead Drug Monitoring; Amiodarone  EKG showed interventricular conduction delay with ST elevation in anterior leads.  Rate of 115.  Left bundle branch block morphology no definite P waves seen.  QTc prolonged.  Significant change when compared to previous.  EKG interpreted by me. [LD]   2356 ECG 12 Lead ED Triage Standing Order; SOA  EKG showed junctional rhythm with a rate of 57.  Diffuse ST depression.  No acute ST elevation.  Normal QTC.  Significant change when compared to previous.  EKG interpreted by me. [LD]      ED Course User Index  [LD] Freida Olivares MD                            Medical Decision Making:  MDM  Number of Diagnoses or Management Options  Cardiac arrest (HCC)  Diagnosis management comments: Patient presented to the emergency department as a CODE BLUE.  On arrival CPR was in progress.  Patient has been undergoing CPR for approximately 40 minutes.  She received epi and bicarb by EMS.  On arrival she received several more doses of epinephrine, bicarb and calcium chloride.  Patient was intubated in the emergency department.  We were able to achieve ROSC while in the emergency department.  Initial EKG showed wide-complex and ST elevation anterior leads.  Repeat showed junctional rhythm with ST depression  diffusely no acute ST elevation.  Patient does have an extensive cardiac history.  While in the emergency department patient had several episodes where heart rate and blood pressure dropped requiring CPR.  We were able to get ROSC fairly quick.  Patient is currently on epinephrine infusion as well as amiodarone.  Chest x-ray showed bilateral infiltrates concerning for pneumonia versus pulmonary edema.  Labs showed elevated lactic acid of 13.  Initial ABG showed a pH of 6.8.  PCO2 was 92 with a PO2 of 142.  Patient was also started on a bicarb infusion.  Patient's vitals seem to stabilize.  I discussed patient with hospitalist and she will be admitted to the ICU for further care.       Amount and/or Complexity of Data Reviewed  Clinical lab tests: ordered and reviewed  Tests in the radiology section of CPT®: ordered and reviewed  Review and summarize past medical records: yes  Discuss the patient with other providers: yes  Independent visualization of images, tracings, or specimens: yes    Risk of Complications, Morbidity, and/or Mortality  Presenting problems: high  Management options: high    Critical Care  Total time providing critical care: 30-74 minutes (I spent 70 minutes providing critical care exclusive of procedures.   Time includes: direct patient care, patient reassessment, coordination of patient care, interpretation of data (laboratory data and imaging), review of patient's medical records, medical consultation, family consultation regarding treatment decisions and documentation of patient care.)       Final diagnoses:   Cardiac arrest (HCC)        Disposition:  ED Disposition     ED Disposition   Decision to Admit    Condition   --    Comment   Level of Care: Critical Care [6]   Diagnosis: Cardiac arrest (HCC) [427.5.ICD-9-CM]   Certification: I Certify That Inpatient Hospital Services Are Medically Necessary For Greater Than 2 Midnights               Please note that portions of this note were  completed with a voice recognition program.     Documentation assistance provided by Annalee Barreto acting as scribe for Freida Olivares MD. Information recorded by the scribe was done at my direction and has been verified and validated by me.          Annalee Barreto  12/05/22 2158       Annalee Barreto  12/05/22 2224       Freida Olivares MD  12/06/22 0000

## 2022-12-06 NOTE — CONSULTS
Pulmonary / Critical Care Consult Note      Patient Name: Lulú Barkley  : 1960  MRN: [id number]  Primary Care Physician:  Jordan Nixon MD  Referring Physician: Brandon Pedro MD  Date of admission: 2022    Subjective   Subjective     Reason for Consult/ Chief Complaint:   Out of hospital cardiac arrest    HPI:  Lulú Barkley is a 62 y.o. female with a history of coronary artery disease status post multivessel PCI who recently had a heart cath last week at [facility] showing severe multivessel coronary disease came with cardiac arrest.  She was supposed to have a CABG in about 2 weeks.  Patient was brought in from EMS in cardiac arrest.  Family was at home and heard a thud.  They found her unresponsive in the bathroom and immediately started CPR and called EMS.  Total code time at their house, in route in our ER has was somewhere between 40 to 50 minutes.  Patient had profound lactic acidosis and hypercapnia on mechanical ventilation.  Patient is on bicarb, vasopressin and epinephrine drips.  Patient has not been on any sedation whatsoever.  Patient is unresponsive on a ventilator with no obvious cranial nerve reflexes.  Patient has amiodarone drip and did have V. tach/V. fib noted on the monitor in the emergency department.    Review of Systems  Cannot obtain as patient is intubated and unresponsive on mechanical ventilation      Personal History     Past Medical History:   Diagnosis Date   • Anxiety    • Depression    • GERD (gastroesophageal reflux disease)    • Heart attack (HCC)    • Heart disease    • Hyperlipidemia    • Hypertension    • Hypokalemia    • Injury of back    • Sleep apnea        Past Surgical History:   Procedure Laterality Date   • CERVICAL SPINE SURGERY         Family History: Cannot obtain as patient is intubated and unresponsive on mechanical ventilation    Social History:  reports that she has quit smoking. She has never used smokeless tobacco. She reports  that she does not currently use alcohol. She reports that she does not use drugs.    Home Medications:  LORazepam, QUEtiapine, albuterol, aspirin, citalopram, furosemide, guaiFENesin, lisinopril, metoprolol succinate XL, mupirocin, omeprazole OTC, potassium chloride, prasugrel, ranolazine, rosuvastatin, and zolpidem    Allergies:  Allergies   Allergen Reactions   • Penicillins Swelling       Objective    Objective     Vitals:   Temp:  [96.9 °F (36.1 °C)-98.4 °F (36.9 °C)] 98.4 °F (36.9 °C)  Heart Rate:  [] 60  Resp:  [17-24] 23  BP: ()/(26-72) 120/51  FiO2 (%):  [85 %-100 %] 100 %    Physical Exam:  Vital Signs Reviewed   General: Obese female intubated and unresponsive on ventilator   HEENT:   Pupils fixed and dilated, no oculocephalic, corneal or pupillary reflexes present.  OP with endotracheal tube  Neck:  Supple, no JVD, no thyromegaly  Chest:  good aeration, coarse crackles and rhonchi bilaterally, tympanic to percussion bilaterally, synchronous with ventilator  CV: RRR, no MGR, pulses 2+, equal.  Abd:  Soft, NT, ND, + BS, no HSM, obese  EXT:  no clubbing, no cyanosis, BLE edema, extremities cold and mottled  Neuro: Intubated and unresponsive, no withdrawal to pain and no sensorium/awareness, has no cough/gag/oculocephalic/corneal/pupillary reflexes, apneic on ventilator  Skin: No rashes or lesions noted      Result Review    Result Review:  I have personally reviewed the results from the time of this admission to 12/6/2022 10:47 EST and agree with these findings:  [x]  Laboratory  [x]  Microbiology  [x]  Radiology  [x]  EKG/Telemetry   [x]  Cardiology/Vascular   []  Pathology  [x]  Old records  []  Other:  Most notable findings include:   Left heart cath report from [facility] personally reviewed showing severe three-vessel disease  Head CT from last night showing severe cerebral edema with loss of gray/white differentiation and lack of visualization of sulci and gyri  Chest CT consistent  with bibasilar atelectasis and pulmonary edema with post CPR rib fractures  CT of the abdomen and pelvis grossly unremarkable  Chest x-ray with adequate position of lines and drains with bilateral airspace disease  Lactic acid markedly elevated  Admission ABG 6.8 1/92/142/14        Lab 12/06/22  0759 12/06/22  0334 12/06/22  0108 12/05/22 2155 12/05/22 2152   WBC  --  16.83*  --  9.76  --    HEMOGLOBIN  --  9.6*  --  12.2  --    HEMATOCRIT  --  35.9  --  42.3  --    PLATELETS  --  241  --  228  --    SODIUM  --   --   --  142  143  --    SODIUM, ARTERIAL 143.3  --  142.5  --  144.4   POTASSIUM  --   --   --  4.7  4.5  --    CHLORIDE  --   --   --  100  101  --    CO2  --   --   --  20.6*  19.5*  --    BUN  --   --   --  18  17  --    CREATININE  --   --   --  1.53*  1.54*  --    GLUCOSE  --   --   --  231*  222*  --    GLUCOSE, ARTERIAL 228*  --  257*  --  258*   CALCIUM  --   --   --  10.7*  10.6*  --    PHOSPHORUS  --   --   --  7.4*  --    TOTAL PROTEIN  --   --   --  6.7  7.2  --    ALBUMIN  --   --   --  3.50  3.40*  --    GLOBULIN  --   --   --  3.2  3.8  --          Assessment & Plan   Assessment / Plan     Active Hospital Problems:  Active Hospital Problems    Diagnosis    • **Cardiac arrest (HCC)      Impression:  Out of hospital cardiac arrest from shockable rhythm  V. tach/V. fib arrest  Cardiogenic shock  Severe three-vessel coronary artery disease, was having pending CABG  Altered mental status  Severe anoxic brain injury  Metabolic acidosis with lactic acidosis  Respiratory acidosis  Acute hypoxemic and hypercapnic respiratory failure require mechanical ventilation  Acute cardiogenic pulmonary edema  Bibasilar atelectasis  NSTEMI  Transaminitis  Acute kidney injury secondary to prerenal etiology versus acute tubular necrosis    Plan:  Cause of arrest was likely cardiac in origin given shockable rhythm with V. tach/V. fib with recent severe three-vessel disease that was diagnosed while  patient is awaiting a pending CABG.  Unfortunately, patient had a very prolonged downtime and CT/clinical evidence consistent with a very severe anoxic brain injury.  Based off CT findings and length of downtime, we will only focus on fever prevention for targeted temperature management  Discontinue bicarb drip.  Acidosis will improve with increased perfusion and pressors  Continue epinephrine and vasopressin drips.  Wean to keep mean arterial pressure greater than 65  Complete amiodarone drip  Continue mechanical ventilation.  Adjust settings to AC 28/450/12.  Wean O2 to keep SPO2 greater than 90%.  Repeat ABG this afternoon  Check echocardiogram  Trend renal panel and electrolytes  Trend lactic acid  Trend liver enzymes  Hold off on tube feeds for now  Agree with aspirin and statin    Discussed with daughter at the bedside that the patient had a very prolonged downtime with prolonged cardiac arrest.  Patient has clinical evidence consistent with a very severe anoxic brain injury.  Based off the above findings, I would expect her to have further brain swelling and will likely be brain dead in the next 48 to 72 hours.  Did discuss with the family that this is an irreversible process.  We are awaiting further family members to arrive, in particular the patient's .  Palliative care has been consulted and I have recommended DNR/DNI at this time.  Ultimately, patient will likely need to be comfort care.    Prognosis dismal    DVT prophylaxis:  Medical and mechanical DVT prophylaxis orders are present.     Code Status and Medical Interventions:   Ordered at: 12/06/22 0863     Level Of Support Discussed With:    Patient     Code Status (Patient has no pulse and is not breathing):    CPR (Attempt to Resuscitate)     Medical Interventions (Patient has pulse or is breathing):    Full Support        The patient is critically ill in the ICU with out-of-hospital cardiac arrest, cardiogenic shock, anoxic brain injury,  acute hypoxemic and hypercapnic respiratory failure require mechanical ventilation, lactic acidosis, pulmonary edema. Multidisciplinary bedside critical care rounds were performed with nursing staff, respiratory therapy, pharmacy, nutritional services, social work. I have personally reviewed the chart, labs and any pertinent imaging available.  I have spent 107 minutes of critical care time, excluding procedures, in the care of this patient.    CPT codes 20110, 83008, 08284    Electronically signed by Suhas Landeros MD, 12/06/22, 10:54 AM EST.

## 2022-12-08 LAB
BACTERIA SPEC RESP CULT: NORMAL
GRAM STN SPEC: NORMAL

## 2022-12-10 LAB
BACTERIA SPEC AEROBE CULT: NORMAL
BACTERIA SPEC AEROBE CULT: NORMAL